# Patient Record
Sex: FEMALE | Race: ASIAN | NOT HISPANIC OR LATINO | Employment: FULL TIME | URBAN - METROPOLITAN AREA
[De-identification: names, ages, dates, MRNs, and addresses within clinical notes are randomized per-mention and may not be internally consistent; named-entity substitution may affect disease eponyms.]

---

## 2017-11-07 ENCOUNTER — HOSPITAL ENCOUNTER (OUTPATIENT)
Facility: MEDICAL CENTER | Age: 62
End: 2017-11-07
Payer: COMMERCIAL

## 2017-11-07 LAB
ERYTHROCYTE [DISTWIDTH] IN BLOOD BY AUTOMATED COUNT: 45.7 FL (ref 35.9–50)
HCT VFR BLD AUTO: 37 % (ref 37–47)
HGB BLD-MCNC: 12.4 G/DL (ref 12–16)
MCH RBC QN AUTO: 31.3 PG (ref 27–33)
MCHC RBC AUTO-ENTMCNC: 33.5 G/DL (ref 33.6–35)
MCV RBC AUTO: 93.4 FL (ref 81.4–97.8)
PLATELET # BLD AUTO: 267 K/UL (ref 164–446)
PMV BLD AUTO: 9.9 FL (ref 9–12.9)
RBC # BLD AUTO: 3.96 M/UL (ref 4.2–5.4)
WBC # BLD AUTO: 7.1 K/UL (ref 4.8–10.8)

## 2019-01-11 ENCOUNTER — OFFICE VISIT (OUTPATIENT)
Dept: URGENT CARE | Facility: PHYSICIAN GROUP | Age: 64
End: 2019-01-11
Payer: COMMERCIAL

## 2019-01-11 VITALS
OXYGEN SATURATION: 99 % | SYSTOLIC BLOOD PRESSURE: 126 MMHG | HEART RATE: 65 BPM | DIASTOLIC BLOOD PRESSURE: 88 MMHG | WEIGHT: 147 LBS | RESPIRATION RATE: 20 BRPM | TEMPERATURE: 97 F

## 2019-01-11 DIAGNOSIS — J01.00 ACUTE NON-RECURRENT MAXILLARY SINUSITIS: Primary | ICD-10-CM

## 2019-01-11 PROCEDURE — 99203 OFFICE O/P NEW LOW 30 MIN: CPT | Performed by: INTERNAL MEDICINE

## 2019-01-11 RX ORDER — FLUTICASONE PROPIONATE 50 MCG
SPRAY, SUSPENSION (ML) NASAL
Qty: 16 G | Refills: 0 | Status: SHIPPED | OUTPATIENT
Start: 2019-01-11 | End: 2019-03-25

## 2019-01-11 RX ORDER — AMOXICILLIN AND CLAVULANATE POTASSIUM 875; 125 MG/1; MG/1
1 TABLET, FILM COATED ORAL 2 TIMES DAILY
Qty: 20 TAB | Refills: 0 | Status: SHIPPED | OUTPATIENT
Start: 2019-01-11 | End: 2019-01-21

## 2019-01-11 ASSESSMENT — ENCOUNTER SYMPTOMS
SINUS PAIN: 1
SINUS PRESSURE: 1
TINGLING: 0
HOARSE VOICE: 1
DIZZINESS: 0
PSYCHIATRIC NEGATIVE: 1
HEADACHES: 1
EYES NEGATIVE: 1
COUGH: 1
FOCAL WEAKNESS: 0
CARDIOVASCULAR NEGATIVE: 1
SORE THROAT: 1
MUSCULOSKELETAL NEGATIVE: 1
WEAKNESS: 0
GASTROINTESTINAL NEGATIVE: 1
SENSORY CHANGE: 0
SHORTNESS OF BREATH: 0

## 2019-01-11 NOTE — PROGRESS NOTES
Subjective:     Luna Carpio is a 63 y.o. female who presents for Cough (x2 weeks, yellow mucus, sinus pressure, HA, chest congestion )       Cough   This is a new problem. Episode onset: 3 weeks ago. The problem has been gradually worsening. The cough is non-productive. Associated symptoms include headaches and a sore throat. Pertinent negatives include no chest pain, ear pain or shortness of breath. There is no history of asthma.   Sinusitis   This is a new problem. Episode onset: 3 weeks ago. The problem has been gradually worsening since onset. There has been no fever. The pain is moderate. Associated symptoms include congestion, coughing, headaches, a hoarse voice, sinus pressure (and pain) and a sore throat. Pertinent negatives include no ear pain or shortness of breath. Treatments tried: Sudafed. The treatment provided mild relief.     PMH:  has no past medical history on file.    MEDS:   Current Outpatient Prescriptions:   •  amoxicillin-clavulanate (AUGMENTIN) 875-125 MG Tab, Take 1 Tab by mouth 2 times a day for 10 days., Disp: 20 Tab, Rfl: 0  •  fluticasone (FLONASE) 50 MCG/ACT nasal spray, 1 spray in each nostril 2 times a day, Disp: 16 g, Rfl: 0    ALLERGIES: Not on File    SURGHX: No past surgical history on file.    SOCHX:  reports that she has never smoked. She has never used smokeless tobacco.     FH: Reviewed with patient, not pertinent to this visit.     Review of Systems   Constitutional: Positive for malaise/fatigue.   HENT: Positive for congestion, hoarse voice, sinus pain, sinus pressure (and pain) and sore throat. Negative for ear pain.    Eyes: Negative.    Respiratory: Positive for cough. Negative for shortness of breath.    Cardiovascular: Negative.  Negative for chest pain.   Gastrointestinal: Negative.    Genitourinary: Negative.    Musculoskeletal: Negative.    Skin: Negative.    Neurological: Positive for headaches. Negative for dizziness, tingling, sensory change, focal weakness and  weakness.   Psychiatric/Behavioral: Negative.    All other systems reviewed and are negative.    Objective:     /88   Pulse 65   Temp 36.1 °C (97 °F)   Resp 20   Wt 66.7 kg (147 lb)   SpO2 99%     Physical Exam   Constitutional: She is oriented to person, place, and time. She appears well-developed and well-nourished. She is cooperative. No distress.   HENT:   Head: Normocephalic.   Right Ear: Tympanic membrane normal.   Left Ear: Tympanic membrane normal.   Nose: Mucosal edema and rhinorrhea present. Right sinus exhibits maxillary sinus tenderness. Left sinus exhibits maxillary sinus tenderness.   Mouth/Throat: Uvula is midline, oropharynx is clear and moist and mucous membranes are normal. No posterior oropharyngeal edema or posterior oropharyngeal erythema.   Eyes: Pupils are equal, round, and reactive to light. Conjunctivae and EOM are normal.   Neck: Normal range of motion.   Cardiovascular: Normal rate, regular rhythm, normal heart sounds and normal pulses.    Pulmonary/Chest: Effort normal and breath sounds normal. No respiratory distress.   Abdominal: Soft. Bowel sounds are normal.   Musculoskeletal: Normal range of motion. She exhibits no deformity.   Lymphadenopathy:     She has no cervical adenopathy.   Neurological: She is alert and oriented to person, place, and time. She has normal strength. No sensory deficit.   Skin: Skin is warm and dry. Capillary refill takes less than 2 seconds.   Psychiatric: She has a normal mood and affect.   Vitals reviewed.       Assessment/Plan:     1. Acute non-recurrent maxillary sinusitis  - amoxicillin-clavulanate (AUGMENTIN) 875-125 MG Tab; Take 1 Tab by mouth 2 times a day for 10 days.  Dispense: 20 Tab; Refill: 0  - fluticasone (FLONASE) 50 MCG/ACT nasal spray; 1 spray in each nostril 2 times a day  Dispense: 16 g; Refill: 0  - REFERRAL TO FAMILY PRACTICE    Discussed supportive measures including increasing fluids and rest as well as OTC symptom management  including acetaminophen and/or ibuprofen PRN pain and/or fever. Continue Sudafed PRN. Also try nasal rinses/neti pot. Rx sent electronically.     Patient advised to: Return for 1) Symptoms don't improve or worsen, or go to ER, 2) Follow up with primary care in 7-10 days.    Differential diagnosis, natural history, supportive care, and indications for immediate follow-up discussed. All questions answered. Patient agrees with the plan of care.    Case and results reviewed and agree with treatment plan as outlined.  Dr. Coates

## 2019-01-11 NOTE — PATIENT INSTRUCTIONS

## 2019-03-25 ENCOUNTER — OFFICE VISIT (OUTPATIENT)
Dept: MEDICAL GROUP | Facility: PHYSICIAN GROUP | Age: 64
End: 2019-03-25
Payer: COMMERCIAL

## 2019-03-25 VITALS
HEIGHT: 69 IN | HEART RATE: 84 BPM | DIASTOLIC BLOOD PRESSURE: 72 MMHG | TEMPERATURE: 98.2 F | SYSTOLIC BLOOD PRESSURE: 106 MMHG | OXYGEN SATURATION: 100 % | WEIGHT: 149 LBS | BODY MASS INDEX: 22.07 KG/M2 | RESPIRATION RATE: 12 BRPM

## 2019-03-25 DIAGNOSIS — R94.31 LEFT AXIS DEVIATION: ICD-10-CM

## 2019-03-25 DIAGNOSIS — Z23 NEED FOR VACCINATION: ICD-10-CM

## 2019-03-25 DIAGNOSIS — E55.9 VITAMIN D DEFICIENCY: ICD-10-CM

## 2019-03-25 DIAGNOSIS — R53.83 FATIGUE, UNSPECIFIED TYPE: ICD-10-CM

## 2019-03-25 DIAGNOSIS — E53.8 VITAMIN B12 DEFICIENCY: ICD-10-CM

## 2019-03-25 DIAGNOSIS — J30.2 SEASONAL ALLERGIC RHINITIS, UNSPECIFIED TRIGGER: ICD-10-CM

## 2019-03-25 DIAGNOSIS — Z11.3 ROUTINE SCREENING FOR STI (SEXUALLY TRANSMITTED INFECTION): ICD-10-CM

## 2019-03-25 DIAGNOSIS — R42 DIZZINESS: Primary | ICD-10-CM

## 2019-03-25 DIAGNOSIS — Z12.11 SCREEN FOR COLON CANCER: ICD-10-CM

## 2019-03-25 DIAGNOSIS — Z51.81 MEDICATION MONITORING ENCOUNTER: ICD-10-CM

## 2019-03-25 DIAGNOSIS — E78.5 DYSLIPIDEMIA: ICD-10-CM

## 2019-03-25 DIAGNOSIS — E16.2 HYPOGLYCEMIA: ICD-10-CM

## 2019-03-25 DIAGNOSIS — Z12.31 ENCOUNTER FOR SCREENING MAMMOGRAM FOR MALIGNANT NEOPLASM OF BREAST: ICD-10-CM

## 2019-03-25 PROCEDURE — 93000 ELECTROCARDIOGRAM COMPLETE: CPT | Performed by: FAMILY MEDICINE

## 2019-03-25 PROCEDURE — 99205 OFFICE O/P NEW HI 60 MIN: CPT | Mod: 25 | Performed by: FAMILY MEDICINE

## 2019-03-25 PROCEDURE — 90715 TDAP VACCINE 7 YRS/> IM: CPT | Performed by: FAMILY MEDICINE

## 2019-03-25 PROCEDURE — 90471 IMMUNIZATION ADMIN: CPT | Performed by: FAMILY MEDICINE

## 2019-03-25 RX ORDER — LORATADINE 10 MG/1
10 TABLET ORAL DAILY
COMMUNITY
End: 2019-07-01

## 2019-03-25 ASSESSMENT — PATIENT HEALTH QUESTIONNAIRE - PHQ9: CLINICAL INTERPRETATION OF PHQ2 SCORE: 0

## 2019-03-25 NOTE — PROGRESS NOTES
cc: Establish care, DTaP vaccine, colon cancer screening, breast cancer screening, lightheadedness & dizzyness    Subjective:     Luna Carpio is a 63 y.o. female presenting  Establish care, DTaP vaccine, colon cancer screening, breast cancer screening, lightheadedness & dizzyness.  She reports she is been getting sometimes lightheaded and dizzy every other day for the last 4 years.  She denies any triggers and sometimes that it happens at rest.  She does exercise and does not notice it happens during exercise.  She denies any history of heart issues.  She denies any fall or any head trauma.  She reports for the same reason about 4 years ago she had a brain MRI done which was normal.  She denies skipping any meals but sometimes when she does feel like she is hungry or low in sugar she feels like this and then she will have something to eat and feel better.  She has had her vision checked recently and no issues with that.  She does have family history of type 2 diabetes and reports being borderline in the past.  She thinks she may have had some fatty liver past.  Lives with partner male. Full time. [employer]. No social or domestic concerns. LMP 10 years ago.  Review of systems:     Constitutional: Negative for fever, chills and positive fatigue.   HENT: Negative for sinus pressure, negative for ear pain or hearing loss  Eyes: Negative for blurriness, negative for double vision  Respiratory: Negative for cough and shortness of breath, negative for exertional shortness of breath  Cardiovascular: Negative for leg swelling, negative for palpitations, negative for chest pain  Gastrointestinal: Negative for nausea, vomiting, abdominal pain, constipation and diarrhea.  Genitourinary: Negative for dysuria and hematuria.   Skin: Negative for rash.   Neurological: Positive for dizziness, negative focal weakness and headaches.   Endo/Heme/Allergies: Denies bleeding, bruising, and recurrent  "allergies.  Psychiatric/Behavioral: Negative for depression and anxiety.        Current Outpatient Prescriptions:   •  loratadine (CLARITIN) 10 MG Tab, Take 10 mg by mouth every day., Disp: , Rfl:     Allergies, past medical history, past surgical history, family history, social history reviewed and updated    Objective:     Vitals: /72 (BP Location: Left arm, Patient Position: Sitting, BP Cuff Size: Adult)   Pulse 84   Temp 36.8 °C (98.2 °F) (Temporal)   Resp 12   Ht 1.753 m (5' 9\")   Wt 67.6 kg (149 lb)   SpO2 100%   Breastfeeding? No   BMI 22.00 kg/m²   General: Alert, pleasant, NAD  HEENT: Normocephalic.  Nontraumatic. EOMI, no icterus or pallor.  Conjunctivae and lids normal. External ears normal. Oropharynx non-erythematous, mucous membranes moist.  Neck supple.  No thyromegaly or masses palpated. No cervical or supraclavicular lymphadenopathy.  Heart: Regular rate and rhythm.  S1 and S2 normal.  No murmurs appreciated.  Respiratory: Normal respiratory effort.  Clear to auscultation bilaterally.  Abdomen: Non-distended, soft, non tender in all 4 quadrants.  Skin: Warm, dry, no rashes.  Musculoskeletal: Gait is normal.  Moves all extremities well.  Extremities: No leg edema.  Pedal pulses 2+ symmetric.   Psych:  Affect/mood is normal, judgement is good, memory is intact, grooming is appropriate.    Assessment/Plan:     Diagnoses and all orders for this visit:    Dizziness  -     EKG  -     EC-ECHOCARDIOGRAM COMPLETE W/ CONT; Future  -     REFERRAL TO CARDIOLOGY    Screen for colon cancer  -     REFERRAL TO GI FOR COLONOSCOPY    Need for vaccination  -     TDAP VACCINE =>8YO IM    Encounter for screening mammogram for malignant neoplasm of breast  -     MA-SCREEN MAMMO W/CAD-BILAT; Future    Seasonal allergic rhinitis, unspecified trigger    Fatigue, unspecified type  -     CBC WITH DIFFERENTIAL; Future  -     TSH WITH REFLEX TO FT4; Future    Medication monitoring encounter  -     Comp Metabolic " Panel; Future    Hypoglycemia  -     HEMOGLOBIN A1C; Future    Dyslipidemia  -     Lipid Profile; Future    Vitamin D deficiency  -     VITAMIN D,25 HYDROXY; Future    Vitamin B12 deficiency  -     VITAMIN B12; Future    Routine screening for STI (sexually transmitted infection)  -     HIV AG/AB COMBO ASSAY SCREENING; Future  -     HEPATITIS PANEL ACUTE(4 COMPONENTS); Future  -     Chlamydia/GC PCR Urine Or Swab; Future  -     MISCELLANEOUS TEST; Future    Left axis deviation  -     REFERRAL TO CARDIOLOGY    Other orders  -     Cancel: OCCULT BLOOD FECES IMMUNOASSAY (FIT); Future    -We will get DTaP vaccine, referral for colonoscopy and mammogram, at least 2 weeks before your next appointment please get fasting lab work 8 hours of no eating but drink water.  Will do EKG today.  Will also make an appointment to do echo imaging of her heart.  Always keep some sugar snacks with you and ensure you are drinking at least 6-8 glasses of water a day.  We will also recommend to get blood pressure cuff and record the top number and bottom number and heart rate in both arms every third day sometimes in the morning, sometimes in the afternoon, sometimes in the evening and fill this form out indicating timing of the day and the patient instructions form for blood pressure.  Ideal blood pressure is 110-120/60-80.  ER precautions given if any chest pain, shortness of breath, passing out, head trauma then please go to the ER or call 911. EKG showing borderline QT prolongation and borderline left axis deviation. Sinus rhythm with rate in the 60s. Will refer to cardiology.    Return in about 6 weeks (around 5/6/2019), or labs/BP, for Long, 40 min appnt.    Patient was seen for 60 minutes face-to-face of which greater than 50% of the visit was spent in counseling and coordination of care as documented above in their assessment and plan.

## 2019-03-25 NOTE — PATIENT INSTRUCTIONS
Diagnoses and all orders for this visit:    Screen for colon cancer  -     REFERRAL TO GI FOR COLONOSCOPY    Need for vaccination  -     TDAP VACCINE =>6YO IM    Encounter for screening mammogram for malignant neoplasm of breast  -     MA-SCREEN MAMMO W/CAD-BILAT; Future    Seasonal allergic rhinitis, unspecified trigger    Fatigue, unspecified type  -     CBC WITH DIFFERENTIAL; Future  -     TSH WITH REFLEX TO FT4; Future    Medication monitoring encounter  -     Comp Metabolic Panel; Future    Hypoglycemia  -     HEMOGLOBIN A1C; Future    Dyslipidemia  -     Lipid Profile; Future    Vitamin D deficiency  -     VITAMIN D,25 HYDROXY; Future    Vitamin B12 deficiency  -     VITAMIN B12; Future    Routine screening for STI (sexually transmitted infection)  -     HIV AG/AB COMBO ASSAY SCREENING; Future  -     HEPATITIS PANEL ACUTE(4 COMPONENTS); Future  -     Chlamydia/GC PCR Urine Or Swab; Future  -     MISCELLANEOUS TEST; Future    Dizziness  -     EKG  -     EC-ECHOCARDIOGRAM COMPLETE W/ CONT; Future    Other orders  -     Cancel: OCCULT BLOOD FECES IMMUNOASSAY (FIT); Future    -We will get DTaP vaccine, referral for colonoscopy and mammogram, at least 2 weeks before your next appointment please get fasting lab work 8 hours of no eating but drink water.  Will do EKG today.  Will also make an appointment to do echo imaging of her heart.  Always keep some sugar snacks with you and ensure you are drinking at least 6-8 glasses of water a day.  We will also recommend to get blood pressure cuff and record the top number and bottom number and heart rate in both arms every third day sometimes in the morning, sometimes in the afternoon, sometimes in the evening and fill this form out indicating timing of the day and the patient instructions form for blood pressure.  Ideal blood pressure is 110-120/60-80.  ER precautions given if any chest pain, shortness of breath, passing out, head trauma then please go to the ER or call  [phone]. EKG showing borderline QT prolongation and borderline left axis deviation. Sinus rhythm with rate in the 60s. Will refer to cardiology.    Return in about 6 weeks (around 5/6/2019), or labs/BP, for Long, 40 min appnt.    How to Take Your Blood Pressure  Blood pressure is a measurement of how strongly your blood is pressing against the walls of your arteries. Arteries are blood vessels that carry blood from your heart throughout your body. Your health care provider takes your blood pressure at each office visit. You can also take your own blood pressure at home with a blood pressure machine. You may need to take your own blood pressure:  · To confirm a diagnosis of high blood pressure (hypertension).  · To monitor your blood pressure over time.  · To make sure your blood pressure medicine is working.  Supplies needed:  To take your blood pressure, you will need a blood pressure machine. You can buy a blood pressure machine, or blood pressure monitor, at most [employer] or online. There are several types of home blood pressure monitors. When choosing one, consider the following:  · Choose a monitor that has an arm cuff.  · Choose a monitor that wraps snugly around your upper arm. You should be able to fit only one finger between your arm and the cuff.  · Do not choose a monitor that measures your blood pressure from your wrist or finger.  Your health care provider can suggest a reliable monitor that will meet your needs.  How to prepare  To get the most accurate reading, avoid the following for 30 minutes before you check your blood pressure:  · Drinking caffeine.  · Drinking alcohol.  · Eating.  · Smoking.  · Exercising.  Five minutes before you check your blood pressure:  · Empty your bladder.  · Sit quietly without talking in a dining chair, rather than in a soft couch or armchair.  How to take your blood pressure  To check your blood pressure, follow the instructions in the manual that came with your blood  "pressure monitor. If you have a digital blood pressure monitor, the instructions may be as follows:  1. Sit up straight.  2. Place your feet on the floor. Do not cross your ankles or legs.  3. Rest your left arm at the level of your heart on a table or desk or on the arm of a chair.  4. Pull up your shirt sleeve.  5. Wrap the blood pressure cuff around the upper part of your left arm, 1 inch (2.5 cm) above your elbow. It is best to wrap the cuff around bare skin.  6. Fit the cuff snugly around your arm. You should be able to place only one finger between the cuff and your arm.  7. Position the cord inside the groove of your elbow.  8. Press the power button.  9. Sit quietly while the cuff inflates and deflates.  10. Read the digital reading on the monitor screen and write it down (record it).  11. Wait 2-3 minutes, then repeat the steps starting at step 1.  What does my blood pressure reading mean?  A blood pressure reading is recorded as two numbers, such as \"120 over 80\" (or 120/80). The first (\"top\") number is called the systolic pressure. It is a measure of the pressure in your arteries as the heart beats. The second (\"bottom\") number is called the diastolic pressure. It is a measure of the pressure in your arteries as the heart relaxes between beats.  Blood pressure is classified into four stages. The following are the stages for adults who do not have a short-term serious illness or a chronic condition. Systolic pressure and diastolic pressure are measured in a unit called mm Hg.  Normal  · Systolic pressure: below 120.  · Diastolic pressure: below 80.  Prehypertension  · Systolic pressure: 120-139.  · Diastolic pressure: 80-89.  Hypertension stage 1  · Systolic pressure: 140-159.  · Diastolic pressure: 90-99.  Hypertension stage 2  · Systolic pressure: 160 or above.  · Diastolic pressure: 100 or above.  You can have prehypertension or hypertension even if only the systolic or only the diastolic number in your " reading is higher than normal.  Follow these instructions at home:  · Check your blood pressure as often as recommended by your health care provider.  · Take your monitor to the next appointment with your health care provider to make sure:  ¨ That you are using it correctly.  ¨ That it provides accurate readings.  · Be sure you understand what your goal blood pressure numbers are.  · Tell your health care provider if you are having any side effects from blood pressure medicine.  Contact a health care provider if:  · Your blood pressure is consistently high.  Get help right away if:  · Your systolic blood pressure is higher than 180.  · Your diastolic blood pressure is higher than 110.  This information is not intended to replace advice given to you by your health care provider. Make sure you discuss any questions you have with your health care provider.  Introduction  Your blood pressure on this visit to the emergency department or clinic is elevated. This does not necessarily mean you have high blood pressure (hypertension), but it does mean that your blood pressure needs to be rechecked. Many times your blood pressure can increase due to illness, pain, anxiety, or other factors.  We recommend that you get a series of blood pressure readings done over a period of 5 days. It is best to get a reading in the morning and one in the evening. You should make sure to sit and relax for 1-5 minutes before the reading is taken. Write the readings down and make a follow-up appointment with your health care provider to discuss the results. If there is not a free clinic or a drug store with a blood-pressure-taking machine near you, you can purchase blood-pressure-taking equipment from a drug store. Having one in the home allows you the convenience of taking your blood pressure while you are home and relaxed.  BLOOD PRESSURE LOG   Date: _______________________  · a.m. _____________________  · p.m. _____________________  Date:  _______________________  · a.m. _____________________  · p.m. _____________________  Date: _______________________  · a.m. _____________________  · p.m. _____________________  Date: _______________________  · a.m. _____________________  · p.m. _____________________  Date: _______________________  · a.m. _____________________  · p.m. _____________________  This information is not intended to replace advice given to you by your health care provider. Make sure you discuss any questions you have with your health care provider.

## 2019-04-05 ENCOUNTER — HOSPITAL ENCOUNTER (OUTPATIENT)
Dept: LAB | Facility: MEDICAL CENTER | Age: 64
End: 2019-04-05
Attending: FAMILY MEDICINE
Payer: COMMERCIAL

## 2019-04-05 DIAGNOSIS — E16.2 HYPOGLYCEMIA: ICD-10-CM

## 2019-04-05 DIAGNOSIS — R53.83 FATIGUE, UNSPECIFIED TYPE: ICD-10-CM

## 2019-04-05 DIAGNOSIS — Z11.3 ROUTINE SCREENING FOR STI (SEXUALLY TRANSMITTED INFECTION): ICD-10-CM

## 2019-04-05 DIAGNOSIS — E78.5 DYSLIPIDEMIA: ICD-10-CM

## 2019-04-05 DIAGNOSIS — E55.9 VITAMIN D DEFICIENCY: ICD-10-CM

## 2019-04-05 DIAGNOSIS — E53.8 VITAMIN B12 DEFICIENCY: ICD-10-CM

## 2019-04-05 DIAGNOSIS — Z51.81 MEDICATION MONITORING ENCOUNTER: ICD-10-CM

## 2019-04-05 LAB
25(OH)D3 SERPL-MCNC: 49 NG/ML (ref 30–100)
ALBUMIN SERPL BCP-MCNC: 4.3 G/DL (ref 3.2–4.9)
ALBUMIN/GLOB SERPL: 1.2 G/DL
ALP SERPL-CCNC: 60 U/L (ref 30–99)
ALT SERPL-CCNC: 12 U/L (ref 2–50)
ANION GAP SERPL CALC-SCNC: 7 MMOL/L (ref 0–11.9)
AST SERPL-CCNC: 20 U/L (ref 12–45)
BASOPHILS # BLD AUTO: 0.7 % (ref 0–1.8)
BASOPHILS # BLD: 0.06 K/UL (ref 0–0.12)
BILIRUB SERPL-MCNC: 1.1 MG/DL (ref 0.1–1.5)
BUN SERPL-MCNC: 16 MG/DL (ref 8–22)
CALCIUM SERPL-MCNC: 9.2 MG/DL (ref 8.5–10.5)
CHLORIDE SERPL-SCNC: 105 MMOL/L (ref 96–112)
CHOLEST SERPL-MCNC: 206 MG/DL (ref 100–199)
CO2 SERPL-SCNC: 28 MMOL/L (ref 20–33)
CREAT SERPL-MCNC: 0.75 MG/DL (ref 0.5–1.4)
EOSINOPHIL # BLD AUTO: 0.2 K/UL (ref 0–0.51)
EOSINOPHIL NFR BLD: 2.5 % (ref 0–6.9)
ERYTHROCYTE [DISTWIDTH] IN BLOOD BY AUTOMATED COUNT: 47.9 FL (ref 35.9–50)
EST. AVERAGE GLUCOSE BLD GHB EST-MCNC: 140 MG/DL
FASTING STATUS PATIENT QL REPORTED: NORMAL
GLOBULIN SER CALC-MCNC: 3.6 G/DL (ref 1.9–3.5)
GLUCOSE SERPL-MCNC: 90 MG/DL (ref 65–99)
HAV IGM SERPL QL IA: NEGATIVE
HBA1C MFR BLD: 6.5 % (ref 0–5.6)
HBV CORE IGM SER QL: NEGATIVE
HBV SURFACE AG SER QL: NEGATIVE
HCT VFR BLD AUTO: 37.7 % (ref 37–47)
HCV AB SER QL: NEGATIVE
HDLC SERPL-MCNC: 68 MG/DL
HGB BLD-MCNC: 12.2 G/DL (ref 12–16)
HIV 1+2 AB+HIV1 P24 AG SERPL QL IA: NON REACTIVE
IMM GRANULOCYTES # BLD AUTO: 0.02 K/UL (ref 0–0.11)
IMM GRANULOCYTES NFR BLD AUTO: 0.2 % (ref 0–0.9)
LDLC SERPL CALC-MCNC: 124 MG/DL
LYMPHOCYTES # BLD AUTO: 2.86 K/UL (ref 1–4.8)
LYMPHOCYTES NFR BLD: 35 % (ref 22–41)
MCH RBC QN AUTO: 30.2 PG (ref 27–33)
MCHC RBC AUTO-ENTMCNC: 32.4 G/DL (ref 33.6–35)
MCV RBC AUTO: 93.3 FL (ref 81.4–97.8)
MONOCYTES # BLD AUTO: 0.5 K/UL (ref 0–0.85)
MONOCYTES NFR BLD AUTO: 6.1 % (ref 0–13.4)
NEUTROPHILS # BLD AUTO: 4.52 K/UL (ref 2–7.15)
NEUTROPHILS NFR BLD: 55.5 % (ref 44–72)
NRBC # BLD AUTO: 0 K/UL
NRBC BLD-RTO: 0 /100 WBC
PLATELET # BLD AUTO: 347 K/UL (ref 164–446)
PMV BLD AUTO: 10 FL (ref 9–12.9)
POTASSIUM SERPL-SCNC: 3.9 MMOL/L (ref 3.6–5.5)
PROT SERPL-MCNC: 7.9 G/DL (ref 6–8.2)
RBC # BLD AUTO: 4.04 M/UL (ref 4.2–5.4)
SODIUM SERPL-SCNC: 140 MMOL/L (ref 135–145)
TRIGL SERPL-MCNC: 70 MG/DL (ref 0–149)
TSH SERPL DL<=0.005 MIU/L-ACNC: 1.92 UIU/ML (ref 0.38–5.33)
VIT B12 SERPL-MCNC: 538 PG/ML (ref 211–911)
WBC # BLD AUTO: 8.2 K/UL (ref 4.8–10.8)

## 2019-04-05 PROCEDURE — 85025 COMPLETE CBC W/AUTO DIFF WBC: CPT

## 2019-04-05 PROCEDURE — 84443 ASSAY THYROID STIM HORMONE: CPT

## 2019-04-05 PROCEDURE — 80074 ACUTE HEPATITIS PANEL: CPT

## 2019-04-05 PROCEDURE — 36415 COLL VENOUS BLD VENIPUNCTURE: CPT

## 2019-04-05 PROCEDURE — 83036 HEMOGLOBIN GLYCOSYLATED A1C: CPT

## 2019-04-05 PROCEDURE — 80061 LIPID PANEL: CPT

## 2019-04-05 PROCEDURE — 80053 COMPREHEN METABOLIC PANEL: CPT

## 2019-04-05 PROCEDURE — 82607 VITAMIN B-12: CPT

## 2019-04-05 PROCEDURE — 87591 N.GONORRHOEAE DNA AMP PROB: CPT

## 2019-04-05 PROCEDURE — 87491 CHLMYD TRACH DNA AMP PROBE: CPT

## 2019-04-05 PROCEDURE — 87389 HIV-1 AG W/HIV-1&-2 AB AG IA: CPT

## 2019-04-05 PROCEDURE — 82306 VITAMIN D 25 HYDROXY: CPT

## 2019-04-06 LAB
C TRACH DNA SPEC QL NAA+PROBE: NEGATIVE
N GONORRHOEA DNA SPEC QL NAA+PROBE: NEGATIVE
SPECIMEN SOURCE: NORMAL

## 2019-04-10 ENCOUNTER — TELEPHONE (OUTPATIENT)
Dept: MEDICAL GROUP | Facility: PHYSICIAN GROUP | Age: 64
End: 2019-04-10

## 2019-04-25 ENCOUNTER — OFFICE VISIT (OUTPATIENT)
Dept: MEDICAL GROUP | Facility: PHYSICIAN GROUP | Age: 64
End: 2019-04-25
Payer: COMMERCIAL

## 2019-04-25 VITALS
OXYGEN SATURATION: 96 % | BODY MASS INDEX: 23.88 KG/M2 | TEMPERATURE: 98.1 F | WEIGHT: 161.2 LBS | RESPIRATION RATE: 12 BRPM | HEART RATE: 74 BPM | SYSTOLIC BLOOD PRESSURE: 106 MMHG | HEIGHT: 69 IN | DIASTOLIC BLOOD PRESSURE: 72 MMHG

## 2019-04-25 DIAGNOSIS — M25.551 PAIN OF BOTH HIP JOINTS: ICD-10-CM

## 2019-04-25 DIAGNOSIS — M25.552 PAIN OF BOTH HIP JOINTS: ICD-10-CM

## 2019-04-25 DIAGNOSIS — G89.29 CHRONIC BILATERAL LOW BACK PAIN WITH LEFT-SIDED SCIATICA: ICD-10-CM

## 2019-04-25 DIAGNOSIS — M54.42 CHRONIC BILATERAL LOW BACK PAIN WITH LEFT-SIDED SCIATICA: ICD-10-CM

## 2019-04-25 PROCEDURE — 99214 OFFICE O/P EST MOD 30 MIN: CPT | Performed by: FAMILY MEDICINE

## 2019-04-25 RX ORDER — COVID-19 ANTIGEN TEST
KIT MISCELLANEOUS
COMMUNITY
End: 2019-04-25

## 2019-04-25 RX ORDER — IBUPROFEN 600 MG/1
600 TABLET ORAL EVERY 8 HOURS PRN
Qty: 60 TAB | Refills: 1 | Status: SHIPPED | OUTPATIENT
Start: 2019-04-25 | End: 2019-12-15

## 2019-04-25 RX ORDER — CYCLOBENZAPRINE HCL 5 MG
5-10 TABLET ORAL 3 TIMES DAILY PRN
Qty: 60 TAB | Refills: 1 | Status: SHIPPED | OUTPATIENT
Start: 2019-04-25 | End: 2019-07-01

## 2019-04-25 NOTE — PROGRESS NOTES
cc: Chronic bilateral low back pain with left-sided sciatica and chronic pain of bilateral hips    Subjective:     Luna Carpio is a 63 y.o. female presenting Chronic bilateral low back pain with left-sided sciatica and chronic pain of bilateral hips.  She reports for the last 5 days she is been getting increased pain of her low back pain with left-sided sciatica at nighttime and then the pain goes into both of her hips as well.  She said that she has had issues in the past with her low back pain and pain in her left hip but this has gotten worse since she drove to [place] and was sitting in the car for that many hours.  Her job requires her to be sitting for long periods of time which makes this worse.  For now she is just doing Aleve 1 tablet a day for the pain.  She also has a hemp cream that she topically massages at night which helps a little bit.  She denies any groin numbness she denies any injury to her back.  She denies any foot drop.  She denies any limp or problems walking.  She denies any loss of bowel or bladder control.  When she does do the bowel movement it does put some pain and pressure on her lower back.  She is not currently icing it.  But she is putting some heat on it which helps some.  She reports the pain right now is about 8 out of 10.  She has not had any back surgeries or hip surgeries.  She reports she seen orthopedic at [facility] before who did an epidural shot on her lower back about 4 years ago and cortisone shot in her hip about 1 year ago.  She has not had any recent imaging of her lower back or hips.  She has not had a DEXA scan recently.  She has not had any fractures or broken bones or car accidents.  She is able to sleep but when she wakes up in the pain is the worst when she gets up.  Stretching and heat therapy and Aleve makes it better.  Denies any dysuria or kidney pain.    Review of systems:     Constitutional: Negative for fever, chills and negative fatigue.   Eyes: Negative  "for blurriness  Respiratory: Negative for cough and shortness of breath  Cardiovascular: Negative for leg swelling, negative for palpitations, negative for chest pain  Gastrointestinal: Negative for nausea, vomiting, abdominal pain,  Genitourinary: Negative for dysuria and hematuria.   Skin: Negative for rash.   Neurological: Negative for dizziness, focal weakness and headaches.   Endo/Heme/Allergies: Denies bleeding, bruising        Current Outpatient Prescriptions:   •  cyclobenzaprine (FLEXERIL) 5 MG tablet, Take 1-2 Tabs by mouth 3 times a day as needed., Disp: 60 Tab, Rfl: 1  •  ibuprofen (MOTRIN) 600 MG Tab, Take 1 Tab by mouth every 8 hours as needed., Disp: 60 Tab, Rfl: 1  •  loratadine (CLARITIN) 10 MG Tab, Take 10 mg by mouth every day., Disp: , Rfl:     Allergies, past medical history, past surgical history, family history, social history reviewed and updated    Objective:     Vitals: /72 (BP Location: Left arm, Patient Position: Sitting, BP Cuff Size: Adult)   Pulse 74   Temp 36.7 °C (98.1 °F) (Temporal)   Resp 12   Ht 1.753 m (5' 9\")   Wt 73.1 kg (161 lb 3.2 oz)   SpO2 96%   Breastfeeding? No   BMI 23.81 kg/m²   General: Alert, pleasant, NAD  HEENT: Normocephalic.  Nontraumatic. EOMI, no icterus or pallor.  Conjunctivae and lids normal. External ears normal.  Heart: Regular rate and rhythm.  S1 and S2 normal.  No murmurs appreciated.  Respiratory: Normal respiratory effort.  Clear to auscultation bilaterally.  Abdomen: Non-distended, soft, non tender  Skin: Warm, dry, no rashes.  Musculoskeletal: Gait is normal.  Moves all extremities well.  Good extension and flexion at her neck and hips some pain on abduction of both of her hips and tenderness on posterior palpation of her hips and some spasm on palpation of her lower back on her bilateral paraspinous muscles.  Extremities: No leg edema.   Psych:  Affect/mood is normal, judgement is good, memory is intact, grooming is " appropriate.    Assessment/Plan:     Diagnoses and all orders for this visit:    Chronic bilateral low back pain with left-sided sciatica  -     DX-LUMBAR SPINE-4+ VIEWS; Future  -     cyclobenzaprine (FLEXERIL) 5 MG tablet; Take 1-2 Tabs by mouth 3 times a day as needed.  -     ibuprofen (MOTRIN) 600 MG Tab; Take 1 Tab by mouth every 8 hours as needed.    Pain of both hip joints  -     DX-HIP-BILATERAL-WITH PELVIS-3/4 VIEWS; Future  -     cyclobenzaprine (FLEXERIL) 5 MG tablet; Take 1-2 Tabs by mouth 3 times a day as needed.  -     ibuprofen (MOTRIN) 600 MG Tab; Take 1 Tab by mouth every 8 hours as needed.    -We will get x-rays of her lower back and bilateral hips.  Will try during the daytime with food to 600 mg of ibuprofen 1 tablet every 12 hours as needed for pain only and before bed try Flexeril 5 mg 1 to 2 tablets before bed to help relieve the muscle spasm.  Also recommend alternating 50 minutes of cold ice therapy and heating compress.  Can also try over-the-counter icy  rub and icy hot patches on her back and or hips.  Also patient instructions given on back pain, hip bursitis, and hip and back exercises for her to do.  If this does not improve from doing these exercises and yoga stretching and also she has access to a pool to do some water therapy or some hot tub and if this does not improve the x-rays show at her follow-up in 4 weeks will consider a hip injection.  Keep stretching and as she is able to sleep at night most likely this is muscular skeletal.  Will go over lab work at her next appointment.    Return in about 4 weeks (around 5/20/2019), or possible joint injection/labs FU, for Long, 40 min appnt, Procedure appnt.

## 2019-04-25 NOTE — PATIENT INSTRUCTIONS
Diagnoses and all orders for this visit:    Chronic bilateral low back pain with left-sided sciatica  -     DX-LUMBAR SPINE-4+ VIEWS; Future  -     cyclobenzaprine (FLEXERIL) 5 MG tablet; Take 1-2 Tabs by mouth 3 times a day as needed.  -     ibuprofen (MOTRIN) 600 MG Tab; Take 1 Tab by mouth every 8 hours as needed.    Pain of both hip joints  -     DX-HIP-BILATERAL-WITH PELVIS-3/4 VIEWS; Future  -     cyclobenzaprine (FLEXERIL) 5 MG tablet; Take 1-2 Tabs by mouth 3 times a day as needed.  -     ibuprofen (MOTRIN) 600 MG Tab; Take 1 Tab by mouth every 8 hours as needed.    -We will get x-rays of her lower back and bilateral hips.  Will try during the daytime with food to 600 mg of ibuprofen 1 tablet every 12 hours as needed for pain only and before bed try Flexeril 5 mg 1 to 2 tablets before bed to help relieve the muscle spasm.  Also recommend alternating 50 minutes of cold ice therapy and heating compress.  Can also try over-the-counter icy  rub and icy hot patches on her back and or hips.  Also patient instructions given on back pain, hip bursitis, and hip and back exercises for her to do.  If this does not improve from doing these exercises and yoga stretching and also she has access to a pool to do some water therapy or some hot tub and if this does not improve the x-rays show at her follow-up in 4 weeks will consider a hip injection.  Keep stretching and as she is able to sleep at night most likely this is muscular skeletal.    Return in about 4 weeks (around 5/20/2019), or possible joint injection/labs FU, for Long, 40 min appnt, Procedure appnt.  Hip Bursitis  Introduction  Hip bursitis is inflammation of a fluid-filled sac (bursa) in the hip joint. The bursa protects the bones in the hip joint from rubbing against each other. Hip bursitis can cause mild to moderate pain, and symptoms often come and go over time.  What are the causes?  This condition may be caused by:  · Injury to the  hip.  · Overuse of the muscles that surround the hip joint.  · Arthritis or gout.  · Diabetes.  · Thyroid disease.  · Cold weather.  · Infection.  In some cases, the cause may not be known.  What are the signs or symptoms?  Symptoms of this condition may include:  · Mild or moderate pain in the hip area. Pain may get worse with movement.  · Tenderness and swelling of the hip, especially on the outer side of the hip.  Symptoms may come and go. If the bursa becomes infected, you may have the following symptoms:  · Fever.  · Red skin and a feeling of warmth in the hip area.  How is this diagnosed?  This condition may be diagnosed based on:  · A physical exam.  · Your medical history.  · X-rays.  · Removal of fluid from your inflamed bursa for testing (biopsy).  You may be sent to a health care provider who specializes in bone diseases (orthopedist) or a provider who specializes in joint inflammation (rheumatologist).  How is this treated?  This condition is treated by resting, raising (elevating), and applying pressure (compression) to the injured area. In some cases, this may be enough to make your symptoms go away. Treatment may also include:  · Crutches.  · Antibiotic medicine.  · Draining fluid out of the bursa to help relieve swelling.  · Injecting medicine that helps to reduce inflammation (cortisone).  Follow these instructions at home:  Medicines  · Take over-the-counter and prescription medicines only as told by your health care provider.  · Do not drive or operate heavy machinery while taking prescription pain medicine, or as told by your health care provider.  · If you were prescribed an antibiotic, take it as told by your health care provider. Do not stop taking the antibiotic even if you start to feel better.  Activity  · Return to your normal activities as told by your health care provider. Ask your health care provider what activities are safe for you.  · Rest and protect your hip as much as possible  until your pain and swelling get better.  General instructions  · Wear compression wraps only as told by your health care provider.  · Elevate your hip above the level of your heart as much as you can without pain. To do this, try putting a pillow under your hips while you lie down.  · Do not use your hip to support your body weight until your health care provider says that you can. Use crutches as told by your health care provider.  · Gently massage and stretch your injured area as often as is comfortable.  · Keep all follow-up visits as told by your health care provider. This is important.  How is this prevented?  · Exercise regularly, as told by your health care provider.  · Warm up and stretch before being active.  · Cool down and stretch after being active.  · If an activity irritates your hip or causes pain, avoid the activity as much as possible.  · Avoid sitting down for long periods at a time.  Contact a health care provider if:  · You have a fever.  · You develop new symptoms.  · You have difficulty walking or doing everyday activities.  · You have pain that gets worse or does not get better with medicine.  · You develop red skin or a feeling of warmth in your hip area.  Get help right away if:  · You cannot move your hip.  · You have severe pain.  This information is not intended to replace advice given to you by your health care provider. Make sure you discuss any questions you have with your health care provider.  Document Released: 06/09/2003 Document Revised: 05/25/2017 Document Reviewed: 07/19/2016  © 2017 [employer]  Back Exercises  Back exercises help treat and prevent back injuries. The goal of back exercises is to increase the strength of your abdominal and back muscles and the flexibility of your back. These exercises should be started when you no longer have back pain. Back exercises include:  · Pelvic Tilt. Lie on your back with your knees bent. Tilt your pelvis until the lower part of your back  is against the floor. Hold this position 5 to 10 sec and repeat 5 to 10 times.  · Knee to Chest. Pull first 1 knee up against your chest and hold for 20 to 30 seconds, repeat this with the other knee, and then both knees. This may be done with the other leg straight or bent, whichever feels better.  · Sit-Ups or Curl-Ups. Bend your knees 90 degrees. Start with tilting your pelvis, and do a partial, slow sit-up, lifting your trunk only 30 to 45 degrees off the floor. Take at least 2 to 3 seconds for each sit-up. Do not do sit-ups with your knees out straight. If partial sit-ups are difficult, simply do the above but with only tightening your abdominal muscles and holding it as directed.  · Hip-Lift. Lie on your back with your knees flexed 90 degrees. Push down with your feet and shoulders as you raise your hips a couple inches off the floor; hold for 10 seconds, repeat 5 to 10 times.  · Back arches. Lie on your stomach, propping yourself up on bent elbows. Slowly press on your hands, causing an arch in your low back. Repeat 3 to 5 times. Any initial stiffness and discomfort should lessen with repetition over time.  · Shoulder-Lifts. Lie face down with arms beside your body. Keep hips and torso pressed to floor as you slowly lift your head and shoulders off the floor.  Do not overdo your exercises, especially in the beginning. Exercises may cause you some mild back discomfort which lasts for a few minutes; however, if the pain is more severe, or lasts for more than 15 minutes, do not continue exercises until you see your caregiver. Improvement with exercise therapy for back problems is slow.   See your caregivers for assistance with developing a proper back exercise program.     This information is not intended to replace advice given to you by your health care provider. Make sure you discuss any questions you have with your health care provider.     Document Released: 01/25/2006 Document Revised: 03/11/2013 Document  Reviewed: 02/11/2016  [employer] Patient Education ©2016 [employer]  Chronic Back Pain  When back pain lasts longer than 3 months, it is called chronic back pain. The cause of your back pain may not be known. Some common causes include:  · Wear and tear (degenerative disease) of the bones, ligaments, or disks in your back.  · Inflammation and stiffness in your back (arthritis).  People who have chronic back pain often go through certain periods in which the pain is more intense (flare-ups). Many people can learn to manage the pain with home care.  Follow these instructions at home:  Pay attention to any changes in your symptoms. Take these actions to help with your pain:  Activity  · Avoid bending and activities that make the problem worse.  · Do not sit or  one place for long periods of time.  · Take brief periods of rest throughout the day. This will reduce your pain. Resting in a lying or standing position is usually better than sitting to rest.  · When you are resting for longer periods, mix in some mild activity or stretching between periods of rest. This will help to prevent stiffness and pain.  · Get regular exercise. Ask your health care provider what activities are safe for you.  · Do not lift anything that is heavier than 10 lb (4.5 kg). Always use proper lifting technique, which includes:  ¨ Bending your knees.  ¨ Keeping the load close to your body.  ¨ Avoiding twisting.  Managing pain  · If directed, apply ice to the painful area. Your health care provider may recommend applying ice during the first 24-48 hours after a flare-up begins.  ¨ Put ice in a plastic bag.  ¨ Place a towel between your skin and the bag.  ¨ Leave the ice on for 20 minutes, 2-3 times per day.  · After icing, apply heat to the affected area as often as told by your health care provider. Use the heat source that your health care provider recommends, such as a moist heat pack or a heating pad.  ¨ Place a towel  between your skin and the heat source.  ¨ Leave the heat on for 20-30 minutes.  ¨ Remove the heat if your skin turns bright red. This is especially important if you are unable to feel pain, heat, or cold. You may have a greater risk of getting burned.  · Try soaking in a warm tub.  · Take over-the-counter and prescription medicines only as told by your health care provider.  · Keep all follow-up visits as told by your health care provider. This is important.  Contact a health care provider if:  · You have pain that is not relieved with rest or medicine.  Get help right away if:  · You have weakness or numbness in one or both of your legs or feet.  · You have trouble controlling your bladder or your bowels.  · You have nausea or vomiting.  · You have pain in your abdomen.  · You have shortness of breath or you faint.  This information is not intended to replace advice given to you by your health care provider. Make sure you discuss any questions you have with your health care provider.  Document Released: 01/25/2006 Document Revised: 04/27/2017 Document Reviewed: 06/06/2016  [employer] Patient Education © 2017 [employer]  Hip Exercises  Ask your health care provider which exercises are safe for you. Do exercises exactly as told by your health care provider and adjust them as directed. It is normal to feel mild stretching, pulling, tightness, or discomfort as you do these exercises, but you should stop right away if you feel sudden pain or your pain gets worse. Do not begin these exercises until told by your health care provider.  STRETCHING AND RANGE OF MOTION EXERCISES   These exercises warm up your muscles and joints and improve the movement and flexibility of your hip. These exercises also help to relieve pain, numbness, and tingling.  Exercise A: Hamstrings, Supine   1. Lie on your back.  2. Loop a belt or towel over the ball of your left / right foot. The ball of your foot is on the walking surface,  right under your toes.  3. Straighten your left / right knee and slowly pull on the belt to raise your leg.  ¨ Do not let your left / right knee bend while you do this.  ¨ Keep your other leg flat on the floor.  ¨ Raise the left / right leg until you feel a gentle stretch behind your left / right knee or thigh.  4. Hold this position for __________ seconds.  5. Slowly return your leg to the starting position.  Repeat __________ times. Complete this stretch __________ times a day.  Exercise B: Hip Rotators   1. Lie on your back on a firm surface.  2. Hold your left / right knee with your left / right hand. Hold your ankle with your other hand.  3. Gently pull your left / right knee and rotate your lower leg toward your other shoulder.  ¨ Pull until you feel a stretch in your buttocks.  ¨ Keep your hips and shoulders firmly planted while you do this stretch.  4. Hold this position for __________ seconds.  Repeat __________ times. Complete this stretch __________ times a day.  Exercise C: V-Sit (Hamstrings and Adductors)   1. Sit on the floor with your legs extended in a large “V” shape. Keep your knees straight during this exercise.  2. Start with your head and chest upright, then bend at your waist to reach for your left foot (position A). You should feel a stretch in your right inner thigh.  3. Hold this position for __________ seconds. Then slowly return to the upright position.  4. Bend at your waist to reach forward (position B). You should feel a stretch behind both of your thighs and knees.  5. Hold this position for __________ seconds. Then slowly return to the upright position.  6. Bend at your waist to reach for your right foot (position C). You should feel a stretch in your left inner thigh.  7. Hold this position for __________ seconds. Then slowly return to the upright position.  Repeat __________ times. Complete this stretch __________ times a day.  Exercise D: Lunge (Hip Flexors)   1. Place your left /  right knee on the floor and bend your other knee so that is directly over your ankle. You should be half-kneeling.  2. Keep good posture with your head over your shoulders.  3. Tighten your buttocks to point your tailbone downward. This helps your back to keep from arching too much.  4. You should feel a gentle stretch in the front of your left / right thigh and hip. If you do not feel any resistance, slightly slide your other foot forward and then slowly lunge forward so your knee once again lines up over your ankle.  5. Make sure your tailbone continues to point downward.  6. Hold this position for __________ seconds.  Repeat __________ times. Complete this stretch __________ times a day.  STRENGTHENING EXERCISES   These exercises build strength and endurance in your hip. Endurance is the ability to use your muscles for a long time, even after they get tired.  Exercise E: Bridge (Hip Extensors)   1. Lie on your back on a firm surface with your knees bent and your feet flat on the floor.  2. Tighten your buttocks muscles and lift your bottom off the floor until the trunk of your body is level with your thighs.  ¨ Do not arch your back.  ¨ You should feel the muscles working in your buttocks and the back of your thighs. If you do not feel these muscles, slide your feet 1-2 inches (2.5-5 cm) farther away from your buttocks.  3. Hold this position for __________ seconds.  4. Slowly lower your hips to the starting position.  5. Let your muscles relax completely between repetitions.  6. If this exercise is too easy, try doing it with your arms crossed over your chest.  Repeat __________ times. Complete this exercise __________ times a day.  Exercise F: Straight Leg Raises - Hip Abductors   1. Lie on your side with your left / right leg in the top position. Lie so your head, shoulder, knee, and hip line up with each other. You may bend your bottom knee to help you balance.  2. Roll your hips slightly forward, so your  hips are stacked directly over each other and your left / right knee is facing forward.  3. Leading with your heel, lift your top leg 4-6 inches (10-15 cm). You should feel the muscles in your outer hip lifting.  ¨ Do not let your foot drift forward.  ¨ Do not let your knee roll toward the ceiling.  4. Hold this position for __________ seconds.  5. Slowly return to the starting position.  6. Let your muscles relax completely between repetitions.  Repeat __________ times. Complete this exercise __________ times a day.  Exercise G: Straight Leg Raises - Hip Adductors   1. Lie on your side with your left / right leg in the bottom position. Lie so your head, shoulder, knee, and hip line up. You may place your upper foot in front to help you balance.  2. Roll your hips slightly forward, so your hips are stacked directly over each other and your left / right knee is facing forward.  3. Tense the muscles in your inner thigh and lift your bottom leg 4-6 inches (10-15 cm).  4. Hold this position for __________ seconds.  5. Slowly return to the starting position.  6. Let your muscles relax completely between repetitions.  Repeat __________ times. Complete this exercise __________ times a day.  Exercise H: Straight Leg Raises - Quadriceps   1. Lie on your back with your left / right leg extended and your other knee bent.  2. Tense the muscles in the front of your left / right thigh. When you do this, you should see your kneecap slide up or see increased dimpling just above your knee.  3. Tighten these muscles even more and raise your leg 4-6 inches (10-15 cm) off the floor.  4. Hold this position for __________ seconds.  5. Keep these muscles tense as you lower your leg.  6. Relax the muscles slowly and completely between repetitions.  Repeat __________ times. Complete this exercise __________ times a day.  Exercise I: Hip Abductors, Standing  1. Tie one end of a rubber exercise band or tubing to a secure surface, such as a  "table or pole.  2. Loop the other end of the band or tubing around your left / right ankle.  3. Keeping your ankle with the band or tubing directly opposite of the secured end, step away until there is tension in the tubing or band. Hold onto a chair as needed for balance.  4. Lift your left / right leg out to your side. While you do this:  ¨ Keep your back upright.  ¨ Keep your shoulders over your hips.  ¨ Keep your toes pointing forward.  ¨ Make sure to use your hip muscles to lift your leg. Do not \"throw\" your leg or tip your body to lift your leg.  5. Hold this position for __________ seconds.  6. Slowly return to the starting position.  Repeat __________ times. Complete this exercise __________ times a day.  Exercise J: Squats (Quadriceps)  1.  a door frame so your feet and knees are in line with the frame. You may place your hands on the frame for balance.  2. Slowly bend your knees and lower your hips like you are going to sit in a chair.  ¨ Keep your lower legs in a straight-up-and-down position.  ¨ Do not let your hips go lower than your knees.  ¨ Do not bend your knees lower than told by your health care provider.  ¨ If your hip pain increases, do not bend as low.  3. Hold this position for ___________ seconds.  4. Slowly push with your legs to return to standing. Do not use your hands to pull yourself to standing.  Repeat __________ times. Complete this exercise __________ times a day.  This information is not intended to replace advice given to you by your health care provider. Make sure you discuss any questions you have with your health care provider.    "

## 2019-07-01 ENCOUNTER — HOSPITAL ENCOUNTER (OUTPATIENT)
Dept: RADIOLOGY | Facility: MEDICAL CENTER | Age: 64
End: 2019-07-01
Attending: FAMILY MEDICINE
Payer: COMMERCIAL

## 2019-07-01 ENCOUNTER — PATIENT MESSAGE (OUTPATIENT)
Dept: MEDICAL GROUP | Facility: PHYSICIAN GROUP | Age: 64
End: 2019-07-01

## 2019-07-01 ENCOUNTER — OFFICE VISIT (OUTPATIENT)
Dept: MEDICAL GROUP | Facility: PHYSICIAN GROUP | Age: 64
End: 2019-07-01
Payer: COMMERCIAL

## 2019-07-01 VITALS
RESPIRATION RATE: 12 BRPM | BODY MASS INDEX: 23.46 KG/M2 | DIASTOLIC BLOOD PRESSURE: 78 MMHG | HEIGHT: 69 IN | TEMPERATURE: 98.5 F | SYSTOLIC BLOOD PRESSURE: 102 MMHG | OXYGEN SATURATION: 98 % | HEART RATE: 72 BPM | WEIGHT: 158.4 LBS

## 2019-07-01 DIAGNOSIS — M25.552 PAIN OF BOTH HIP JOINTS: ICD-10-CM

## 2019-07-01 DIAGNOSIS — E78.5 DYSLIPIDEMIA: ICD-10-CM

## 2019-07-01 DIAGNOSIS — M25.551 PAIN OF BOTH HIP JOINTS: ICD-10-CM

## 2019-07-01 DIAGNOSIS — E11.9 TYPE 2 DIABETES MELLITUS WITHOUT COMPLICATION, WITHOUT LONG-TERM CURRENT USE OF INSULIN (HCC): ICD-10-CM

## 2019-07-01 PROBLEM — E16.2 HYPOGLYCEMIA: Status: RESOLVED | Noted: 2019-03-25 | Resolved: 2019-07-01

## 2019-07-01 PROBLEM — R42 DIZZINESS: Status: RESOLVED | Noted: 2019-03-25 | Resolved: 2019-07-01

## 2019-07-01 PROCEDURE — 99214 OFFICE O/P EST MOD 30 MIN: CPT | Performed by: FAMILY MEDICINE

## 2019-07-01 PROCEDURE — 73522 X-RAY EXAM HIPS BI 3-4 VIEWS: CPT

## 2019-07-01 RX ORDER — ATORVASTATIN CALCIUM 20 MG/1
20 TABLET, FILM COATED ORAL EVERY EVENING
Qty: 90 TAB | Refills: 1 | Status: SHIPPED | OUTPATIENT
Start: 2019-07-01 | End: 2019-12-02

## 2019-07-01 NOTE — PATIENT INSTRUCTIONS
Diagnoses and all orders for this visit:    Type 2 diabetes mellitus without complication, without long-term current use of insulin (HCC)  -     metFORMIN (GLUCOPHAGE) 500 MG Tab; Take 1 Tab by mouth 2 times a day, with meals.    Dyslipidemia  -     atorvastatin (LIPITOR) 20 MG Tab; Take 1 Tab by mouth every evening.    Pain of both hip joints    -Complete blood count within normal limits, complete metabolic panel within normal limits, kidney GFR within normal limits, STD screen chlamydia and gonorrhea negative, A1c 6.5.  TSH thyroid within normal limits, lipid panel with LDL high at 124 and total cholesterol mildly high at 206.  Vitamin D 49 within normal limits, vitamin B12 538, rest of STD screening HIV and hepatitis negative.  She is currently supplementing with vitamin D and B12.  -For new onset diabetes A1c 6.5 we will start her on low-dose metformin 500 mg twice a day with meals this can initially cause some loose stools or diarrhea but that should go away after couple of days.  Also take atorvastatin cholesterol medication at night daily for her cholesterol and she could do over-the-counter red yeast and or omega-3 fatty acid fish oil twice a day with meals.  Please read patient instruction section on cholesterol and new medications and frequently asked questions about diabetes and hypoglycemic precautions and always keep some sugar candy or snack on you in case you feel low sugar.  Drink enough water as well to stay hydrated.  She is [place] seen the eye doctor and will follow-up in 1 year.  Please check with the patient instructions on diabetes and foot care and check your feet daily to avoid any infections.  Please use ibuprofen sparingly for hips and then when she returns back from her trip and gets the x-ray done we will do a left hip injection on her return.  She will work on her diet and exercise and can try some apps if you want as lose it or my fitness tracker to keep track of what she is eating  only thing is they do not restrict for fruits to make sure not having too much fruits as that has a lot of sugar and look at food labels which is in the patient instruction section and she will work on her diabetes and we will recheck her A1c in 3 months and if she still not improving then we will send her to the diabetic nurse but for now she will try on her own with her diet and exercise and keep track of what she is eating and reduce her sugars and fried food at the [employer] parties.  She has been prediabetic in the past but never diabetic and so we will work on this and try for a 5 pound weight loss and to work on nutritious snacks and diets with portion control and limiting certain carbs.  Drink plenty of water and we will see her back in a few weeks for her hip injection and then in 3 months for her diabetes recheck.    Return in about 3 months (around 10/1/2019), or new diabetes/meds/A1C/ 3 weeks for L hip injection procedure, for Diabetes.    Fat and Cholesterol Restricted Diet  High levels of fat and cholesterol in your blood may lead to various health problems, such as diseases of the heart, blood vessels, gallbladder, liver, and pancreas. Fats are concentrated sources of energy that come in various forms. Certain types of fat, including saturated fat, may be harmful in excess. Cholesterol is a substance needed by your body in small amounts. Your body makes all the cholesterol it needs. Excess cholesterol comes from the food you eat.  When you have high levels of cholesterol and saturated fat in your blood, health problems can develop because the excess fat and cholesterol will gather along the walls of your blood vessels, causing them to narrow. Choosing the right foods will help you control your intake of fat and cholesterol. This will help keep the levels of these substances in your blood within normal limits and reduce your risk of disease.  WHAT IS MY PLAN?  Your health care provider recommends that  "you:  · Get no more than __________ % of the total calories in your daily diet from fat.  · Limit your intake of saturated fat to less than ______% of your total calories each day.  · Limit the amount of cholesterol in your diet to less than _________mg per day.  WHAT TYPES OF FAT SHOULD I CHOOSE?  · Choose healthy fats more often. Choose monounsaturated and polyunsaturated fats, such as olive and canola oil, flaxseeds, walnuts, almonds, and seeds.  · Eat more omega-3 fats. Good choices include salmon, mackerel, sardines, tuna, flaxseed oil, and ground flaxseeds. Aim to eat fish at least two times a week.  · Limit saturated fats. Saturated fats are primarily found in animal products, such as meats, butter, and cream. Plant sources of saturated fats include palm oil, palm kernel oil, and coconut oil.  · Avoid foods with partially hydrogenated oils in them. These contain trans fats. Examples of foods that contain trans fats are stick margarine, some tub margarines, cookies, crackers, and other baked goods.  WHAT GENERAL GUIDELINES DO I NEED TO FOLLOW?  These guidelines for healthy eating will help you control your intake of fat and cholesterol:  · Check food labels carefully to identify foods with trans fats or high amounts of saturated fat.  · Fill one half of your plate with vegetables and green salads.  · Fill one fourth of your plate with whole grains. Look for the word \"whole\" as the first word in the ingredient list.  · Fill one fourth of your plate with lean protein foods.  · Limit fruit to two servings a day. Choose fruit instead of juice.  · Eat more foods that contain soluble fiber. Examples of foods that contain this type of fiber are apples, broccoli, carrots, beans, peas, and barley. Aim to get 20-30 g of fiber per day.  · Eat more home-cooked food and less restaurant, buffet, and fast food.  · Limit or avoid alcohol.  · Limit foods high in starch and sugar.  · Limit fried foods.  · Cook foods using " methods other than frying. Baking, boiling, grilling, and broiling are all great options.  · Lose weight if you are overweight. Losing just 5-10% of your initial body weight can help your overall health and prevent diseases such as diabetes and heart disease.  WHAT FOODS CAN I EAT?  Grains  Whole grains, such as whole wheat or whole grain breads, crackers, cereals, and pasta. Unsweetened oatmeal, bulgur, barley, quinoa, or brown rice. Corn or whole wheat flour tortillas.  Vegetables  Fresh or frozen vegetables (raw, steamed, roasted, or grilled). Green salads.  Fruits  All fresh, canned (in natural juice), or frozen fruits.  Meat and Other Protein Products  Ground beef (85% or leaner), grass-fed beef, or beef trimmed of fat. Skinless chicken or turkey. Ground chicken or turkey. Pork trimmed of fat. All fish and seafood. Eggs. Dried beans, peas, or lentils. Unsalted nuts or seeds. Unsalted canned or dry beans.  Dairy  Low-fat dairy products, such as skim or 1% milk, 2% or reduced-fat cheeses, low-fat ricotta or cottage cheese, or plain low-fat yogurt.  Fats and Oils  Tub margarines without trans fats. Light or reduced-fat mayonnaise and salad dressings. Avocado. Olive, canola, sesame, or safflower oils. Natural peanut or almond butter (choose ones without added sugar and oil).  The items listed above may not be a complete list of recommended foods or beverages. Contact your dietitian for more options.  WHAT FOODS ARE NOT RECOMMENDED?  Grains  White bread. White pasta. White rice. Cornbread. Bagels, pastries, and croissants. Crackers that contain trans fat.  Vegetables  White potatoes. Corn. Creamed or fried vegetables. Vegetables in a cheese sauce.  Fruits  Dried fruits. Canned fruit in light or heavy syrup. Fruit juice.  Meat and Other Protein Products  Fatty cuts of meat. Ribs, chicken wings, aranda, sausage, bologna, salami, chitterlings, fatback, hot dogs, bratwurst, and packaged luncheon meats. Liver and organ  meats.  Dairy  Whole or 2% milk, cream, half-and-half, and cream cheese. Whole milk cheeses. Whole-fat or sweetened yogurt. Full-fat cheeses. Nondairy creamers and whipped toppings. Processed cheese, cheese spreads, or cheese curds.  Sweets and Desserts  Corn syrup, sugars, honey, and molasses. Candy. Jam and jelly. Syrup. Sweetened cereals. Cookies, pies, cakes, donuts, muffins, and ice cream.  Fats and Oils  Butter, stick margarine, lard, shortening, ghee, or aranda fat. Coconut, palm kernel, or palm oils.  Beverages  Alcohol. Sweetened drinks (such as sodas, lemonade, and fruit drinks or punches).  The items listed above may not be a complete list of foods and beverages to avoid. Contact your dietitian for more information.     This information is not intended to replace advice given to you by your health care provider. Make sure you discuss any questions you have with your health care provider.     Document Released: 12/18/2006 Document Revised: 01/08/2016 Document Reviewed: 03/18/2015  [employer] Interactive Patient Education ©2016 [employer]  Metformin tablets  What is this medicine?  METFORMIN (met FOR min) is used to treat type 2 diabetes. It helps to control blood sugar. Treatment is combined with diet and exercise. This medicine can be used alone or with other medicines for diabetes.  This medicine may be used for other purposes; ask your health care provider or pharmacist if you have questions.  COMMON BRAND NAME(S): Glucophage  What should I tell my health care provider before I take this medicine?  They need to know if you have any of these conditions:  -anemia  -frequently drink alcohol-containing beverages  -become easily dehydrated  -heart attack  -heart failure that is treated with medications  -kidney disease  -liver disease  -polycystic ovary syndrome  -serious infection or injury  -vomiting  -an unusual or allergic reaction to metformin, other medicines, foods, dyes, or preservatives  -pregnant  or trying to get pregnant  -breast-feeding  How should I use this medicine?  Take this medicine by mouth. Take it with meals. Swallow the tablets with a drink of water. Follow the directions on the prescription label. Take your medicine at regular intervals. Do not take your medicine more often than directed.  Talk to your pediatrician regarding the use of this medicine in children. While this drug may be prescribed for children as young as 10 years of age for selected conditions, precautions do apply.  Overdosage: If you think you have taken too much of this medicine contact a poison control center or emergency room at once.  NOTE: This medicine is only for you. Do not share this medicine with others.  What if I miss a dose?  If you miss a dose, take it as soon as you can. If it is almost time for your next dose, take only that dose. Do not take double or extra doses.  What may interact with this medicine?  Do not take this medicine with any of the following medications:  -dofetilide  -gatifloxacin  -certain contrast medicines given before X-rays, CT scans, MRI, or other procedures  This medicine may also interact with the following medications:  -acetazolamide  -certain medicines for HIV infection or hepatitis, like adefovir, emtricitabine, entecavir, lamivudine, or tenofovir  -cimetidine  -crizotinib  -digoxin  -diuretics  -female hormones, like estrogens or progestins and birth control pills  -glycopyrrolate  -isoniazid  -lamotrigine  -medicines for blood pressure, heart disease, irregular heart beat  -memantine  -midodrine  -methazolamide  -morphine  -nicotinic acid  -phenothiazines like chlorpromazine, mesoridazine, prochlorperazine, thioridazine  -phenytoin  -procainamide  -propantheline  -quinidine  -quinine  -ranitidine  -ranolazine  -steroid medicines like prednisone or cortisone  -stimulant medicines for attention disorders, weight loss, or to stay awake  -thyroid  medicines  -topiramate  -trimethoprim  -trospium  -vancomycin  -vandetanib  -zonisamide  This list may not describe all possible interactions. Give your health care provider a list of all the medicines, herbs, non-prescription drugs, or dietary supplements you use. Also tell them if you smoke, drink alcohol, or use illegal drugs. Some items may interact with your medicine.  What should I watch for while using this medicine?  Visit your doctor or health care professional for regular checks on your progress.  A test called the HbA1C (A1C) will be monitored. This is a simple blood test. It measures your blood sugar control over the last 2 to 3 months. You will receive this test every 3 to 6 months.  Learn how to check your blood sugar. Learn the symptoms of low and high blood sugar and how to manage them.  Always carry a quick-source of sugar with you in case you have symptoms of low blood sugar. Examples include hard sugar candy or glucose tablets. Make sure others know that you can choke if you eat or drink when you develop serious symptoms of low blood sugar, such as seizures or unconsciousness. They must get medical help at once.  Tell your doctor or health care professional if you have high blood sugar. You might need to change the dose of your medicine. If you are sick or exercising more than usual, you might need to change the dose of your medicine.  Do not skip meals. Ask your doctor or health care professional if you should avoid alcohol. Many nonprescription cough and cold products contain sugar or alcohol. These can affect blood sugar.  This medicine may cause ovulation in premenopausal women who do not have regular monthly periods. This may increase your chances of becoming pregnant. You should not take this medicine if you become pregnant or think you may be pregnant. Talk with your doctor or health care professional about your birth control options while taking this medicine. Contact your doctor or health  care professional right away if think you are pregnant.  If you are going to need surgery, a MRI, CT scan, or other procedure, tell your doctor that you are taking this medicine. You may need to stop taking this medicine before the procedure.  Wear a medical ID bracelet or chain, and carry a card that describes your disease and details of your medicine and dosage times.  What side effects may I notice from receiving this medicine?  Side effects that you should report to your doctor or health care professional as soon as possible:  -allergic reactions like skin rash, itching or hives, swelling of the face, lips, or tongue  -breathing problems  -feeling faint or lightheaded, falls  -muscle aches or pains  -signs and symptoms of low blood sugar such as feeling anxious, confusion, dizziness, increased hunger, unusually weak or tired, sweating, shakiness, cold, irritable, headache, blurred vision, fast heartbeat, loss of consciousness  -slow or irregular heartbeat  -unusual stomach pain or discomfort  -unusually tired or weak  Side effects that usually do not require medical attention (report to your doctor or health care professional if they continue or are bothersome):  -diarrhea  -headache  -heartburn  -metallic taste in mouth  -nausea  -stomach gas, upset  This list may not describe all possible side effects. Call your doctor for medical advice about side effects. You may report side effects to FDA at 2-232-FDA-5715.  Where should I keep my medicine?  Keep out of the reach of children.  Store at room temperature between 15 and 30 degrees C (59 and 86 degrees F). Protect from moisture and light. Throw away any unused medicine after the expiration date.  NOTE: This sheet is a summary. It may not cover all possible information. If you have questions about this medicine, talk to your doctor, pharmacist, or health care provider.  © 2018 [employer] (2015-06-02 22:14:40)  Atorvastatin tablets  What is this  medicine?  ATORVASTATIN (a TORE va sta tin) is known as a HMG-CoA reductase inhibitor or 'statin'. It lowers the level of cholesterol and triglycerides in the blood. This drug may also reduce the risk of heart attack, stroke, or other health problems in patients with risk factors for heart disease. Diet and lifestyle changes are often used with this drug.  This medicine may be used for other purposes; ask your health care provider or pharmacist if you have questions.  COMMON BRAND NAME(S): Lipitor  What should I tell my health care provider before I take this medicine?  They need to know if you have any of these conditions:  -frequently drink alcoholic beverages  -history of stroke, TIA  -kidney disease  -liver disease  -muscle aches or weakness  -other medical condition  -an unusual or allergic reaction to atorvastatin, other medicines, foods, dyes, or preservatives  -pregnant or trying to get pregnant  -breast-feeding  How should I use this medicine?  Take this medicine by mouth with a glass of water. Follow the directions on the prescription label. You can take this medicine with or without food. Take your doses at regular intervals. Do not take your medicine more often than directed.  Talk to your pediatrician regarding the use of this medicine in children. While this drug may be prescribed for children as young as 10 years old for selected conditions, precautions do apply.  Overdosage: If you think you have taken too much of this medicine contact a poison control center or emergency room at once.  NOTE: This medicine is only for you. Do not share this medicine with others.  What if I miss a dose?  If you miss a dose, take it as soon as you can. If it is almost time for your next dose, take only that dose. Do not take double or extra doses.  What may interact with this medicine?  Do not take this medicine with any of the following medications:  -red yeast rice  -telaprevir  -telithromycin  -voriconazole  This  medicine may also interact with the following medications:  -alcohol  -antiviral medicines for HIV or AIDS  -boceprevir  -certain antibiotics like clarithromycin, erythromycin, troleandomycin  -certain medicines for cholesterol like fenofibrate or gemfibrozil  -cimetidine  -clarithromycin  -colchicine  -cyclosporine  -digoxin  -female hormones, like estrogens or progestins and birth control pills  -grapefruit juice  -medicines for fungal infections like fluconazole, itraconazole, ketoconazole  -niacin  -rifampin  -spironolactone  This list may not describe all possible interactions. Give your health care provider a list of all the medicines, herbs, non-prescription drugs, or dietary supplements you use. Also tell them if you smoke, drink alcohol, or use illegal drugs. Some items may interact with your medicine.  What should I watch for while using this medicine?  Visit your doctor or health care professional for regular check-ups. You may need regular tests to make sure your liver is working properly.  Tell your doctor or health care professional right away if you get any unexplained muscle pain, tenderness, or weakness, especially if you also have a fever and tiredness. Your doctor or health care professional may tell you to stop taking this medicine if you develop muscle problems. If your muscle problems do not go away after stopping this medicine, contact your health care professional.  This drug is only part of a total heart-health program. Your doctor or a dietician can suggest a low-cholesterol and low-fat diet to help. Avoid alcohol and smoking, and keep a proper exercise schedule.  Do not use this drug if you are pregnant or breast-feeding. Serious side effects to an unborn child or to an infant are possible. Talk to your doctor or pharmacist for more information.  This medicine may affect blood sugar levels. If you have diabetes, check with your doctor or health care professional before you change your diet  or the dose of your diabetic medicine.  If you are going to have surgery tell your health care professional that you are taking this drug.  What side effects may I notice from receiving this medicine?  Side effects that you should report to your doctor or health care professional as soon as possible:  -allergic reactions like skin rash, itching or hives, swelling of the face, lips, or tongue  -dark urine  -fever  -joint pain  -muscle cramps, pain  -redness, blistering, peeling or loosening of the skin, including inside the mouth  -trouble passing urine or change in the amount of urine  -unusually weak or tired  -yellowing of eyes or skin  Side effects that usually do not require medical attention (report to your doctor or health care professional if they continue or are bothersome):  -constipation  -heartburn  -stomach gas, pain, upset  This list may not describe all possible side effects. Call your doctor for medical advice about side effects. You may report side effects to FDA at 5-239-FDA-4878.  Where should I keep my medicine?  Keep out of the reach of children.  Store at room temperature between 20 to 25 degrees C (68 to 77 degrees F). Throw away any unused medicine after the expiration date.  NOTE: This sheet is a summary. It may not cover all possible information. If you have questions about this medicine, talk to your doctor, pharmacist, or health care provider.  © 2018 [employer] Standard (2012-11-06 09:18:24)  2400 Calorie Diet for Diabetes Meal Planning  The 2400 calorie diet is designed for eating up to 2400 calories each day. Following this diet and making healthy meal choices can help improve overall health. This diet controls blood sugar (glucose) levels and can also lower blood pressure and cholesterol.   SERVING SIZES  Measuring foods and serving sizes helps to make sure you are getting the right amount of food. The list below tells how big or small some common serving sizes are.  · 1  oz.........4 stacked dice.   · 3 oz.........Deck of cards.   · 1 tsp........Tip of little finger.   · 1 tbs........Thumb.   · 2 tbs........Golf ball.   · ½ cup.......Half of a fist.   · 1 cup........A fist.   GUIDELINES FOR CHOOSING FOODS  The goal of this diet is to eat a variety of foods and limit calories to 2400 each day. This can be done by choosing foods that are low in calories and in fat. The diet also suggests eating small amounts of food often. Doing this helps control your blood glucose levels so they do not get too high or low. Each meal or snack should contain a protein food source to help you feel more satisfied and to stabilize your blood glucose. Try to eat about the same amount of food around the same time each day. This includes weekend days, travel days, and days off work. Space your meals about 4 to 5 hours apart and add a snack between them if you wish.   For example, a daily food plan could include breakfast, a morning snack, lunch, dinner, and an evening snack. Healthy meals and snacks include whole grains, vegetables, fruits, lean meats, poultry, fish, and dairy products. As you plan your meals, choose a variety of foods. Choose from the bread and starches, vegetables, fruit, dairy, and meat/protein groups. Examples of foods from each group are listed below with their suggested serving sizes. Use measuring cups and spoons to become familiar with what a healthy portion looks like.  Bread and Starch  Each serving equals 15 grams of carbohydrates.  · 1 slice bread.   · ¼ bagel.   · ¾ cup cold cereal (unsweetened).   · ½ cup hot cereal or mashed potatoes.   · 1 small potato (size of a computer mouse).   ·  cup cooked pasta or rice.   · ½ English muffin.   · 1 cup broth-based soup.   · 3 cups of popcorn.   · 4 to 6 whole-wheat crackers.   · ½ cup cooked beans, peas, or corn.   Vegetable  Each serving equals 5 grams of carbohydrates.  · ½ cup cooked vegetables.   · 1 cup raw vegetables.   · ½ cup  tomato or vegetable juice.   Fruit  Each serving equals 15 grams of carbohydrates.  · 1 small apple or orange.   · 1¼ cup watermelon or strawberries.   · ½ cup applesauce (no sugar added).   · 2 tbs raisins.   · ½ banana.   · ½ cup canned fruit, packed in water, in its own juice, or sweetened with a sugar substitute.   · ½ cup unsweetened fruit juice.   Dairy  Each serving equals 12 to 15 grams of carbohydrates.  · 1 cup fat-free milk.   · 6 oz artificially sweetened yogurt or plain yogurt.   · 1 cup low-fat buttermilk.   · 1 cup soy milk.   Meat/Protein  · 1 large egg.   · 2 to 3 oz meat, poultry, or fish.   · ½ cup low-fat cottage cheese.   · 1 tbs peanut butter.   · ½ cup tofu.   · 1 oz low-fat cheese.   · ¼ cup canned tuna in water.   Fat  · 1 tsp oil.   · 1 tsp trans-fat-free margarine.   · 1 tsp butter.   · 1 tsp mayonnaise.   · 2 tbs avocado.   SAMPLE 2400 CALORIE DIET PLAN  Breakfast  · 1 English muffin (2 carb servings).   · 1 scrambled egg.   · 1 tsp margarine.   · 1 cup fat-free milk (1 carb serving).   · 1 large orange (2 carb servings).   Morning Snack  · ¼ cup low-fat cottage cheese.   · ½ cup canned peaches in juice (1 carb serving).   · 1 cup carrot sticks.   Lunch  · Grilled chicken salad.   · 2 oz chicken breast.   · 1 cup kevin lettuce or spinach.   · ½ cup diced tomato.   · ½ cup shredded carrots.   · ¼ cup sliced cucumbers.   · 2 tbs low-fat salad dressing.   · 2 slices whole-wheat bread (2 carb servings).   · 1 small apple (1 carb serving).   · 1 cup fat-free milk (1 carb serving).   · 15 baked chips ( 1 carb serving).   Afternoon Snack  · 8 reduced fat crackers (2 carb servings).   · 2 tbs peanut butter.   Dinner  · 3 oz salmon, broiled.   · 4½ small red potatoes, roasted with 1 tsp olive oil and seasoning (3 carb servings).   · 1 cup green beans.   · 1¼ cup strawberries (1 carb serving).   · 1 cup fat-free milk (1 carb serving).   Evening Snack  · 6 cups air-popped popcorn (2 carb  servings).   · 2 tbs parmesan cheese sprinkled on top.   · 8 almonds.   MEAL PLAN  Use this worksheet to help you make a daily meal plan based on the 2400 calorie diet suggestions. The total amount of carbohydrates in your meal or snack is more important than making sure you include all of the food groups at every meal or snack. If you are using this plan to help you control your blood glucose, you may interchange carbohydrate-containing foods (dairy, starches, and fruits). Choose a variety of fresh foods of varying colors and flavors. You can choose from the following foods to build your day's meals:  · 12 Starches.   · 4 Vegetables.   · 4 Fruits.   · 3 Dairy.   · 7 oz Meat/Protein.   · Up to 8 Fats.   Your dietician can use this worksheet to help you decide how many servings and what types of foods are right for you.  BREAKFAST  Food Group and Servings / Food Choice  Starch ____________________________________________________  Dairy _____________________________________________________  Fruit _____________________________________________________  Meat/Protein ______________________________________________  Fat________________________________________________________  LUNCH  Food Group and Servings / Food Choice   Starch ___________________________________________________  Meat/Protein _____________________________________________  Vegetable ________________________________________________  Fruit _____________________________________________________  Dairy ____________________________________________________  Fat_______________________________________________________  AFTERNOON SNACK  Food Group and Servings / Food Choice  Starch ___________________________________________________  Meat/Protein _____________________________________________  DINNER  Food Group and Servings / Food Choice  Starch ___________________________________________________  Meat/Protein _____________________________________________  Dairy  ____________________________________________________  Vegetable ________________________________________________  Fruit _____________________________________________________  Fat_______________________________________________________  EVENING SNACK  Food Group and Servings / Food Choice  Fruit _____________________________________________________  Meat/Protein ______________________________________________  Starch ____________________________________________________  DAILY TOTALS  Starch __________________________  Vegetable _______________________  Fruits ___________________________  Dairy ___________________________  Meat/Protein ____________________  Fat _____________________________  Document Released: 07/10/2006 Document Revised: 03/11/2013 Document Reviewed: 11/02/2012  ExitCare® Patient Information ©2013 [employer].Diets for Diabetes, Food Labeling  Look at food labels to help you decide how much of a product you can eat. You will want to check the amount of total carbohydrate in a serving to see how the food fits into your meal plan. In the list of ingredients, the ingredient present in the largest amount by weight must be listed first, followed by the other ingredients in descending order.  STANDARD OF IDENTITY  Most products have a list of ingredients. However, foods that the Food and Drug Administration (FDA) has given a standard of identity do not need a list of ingredients. A standard of identity means that a food must contain certain ingredients if it is called a particular name. Examples are mayonnaise, peanut butter, ketchup, jelly, and cheese.  LABELING TERMS  There are many terms found on food labels. Some of these terms have specific definitions. Some terms are regulated by the FDA, and the FDA has clearly specified how they can be used. Others are not regulated or well-defined and can be misleading and confusing.  SPECIFICALLY DEFINED TERMS  Nutritive Sweetener.  · A sweetener that contains  "calories,such as table sugar or honey.  Nonnutritive Sweetener.  · A sweetener with few or no calories,such as saccharin, aspartame, sucralose, and cyclamate.  LABELING TERMS REGULATED BY THE FDA  Free.  · The product contains only a tiny or small amount of fat, cholesterol, sodium, sugar, or calories. For example, a \"fat-free\" product will contain less than 0.5 g of fat per serving.  Low.  · A food described as \"low\" in fat, saturated fat, cholesterol, sodium, or calories could be eaten fairly often without exceeding dietary guidelines. For example, \"low in fat\" means no more than 3 g of fat per serving.  Lean.  · \"Lean\" and \"extra lean\" are U.S. Department of Agriculture (USDA) terms for use on meat and poultry products. \"Lean\" means the product contains less than 10 g of fat, 4 g of saturated fat, and 95 mg of cholesterol per serving. \"Lean\" is not as low in fat as a product labeled \"low.\"  Extra Lean.  · \"Extra lean\" means the product contains less than 5 g of fat, 2 g of saturated fat, and 95 mg of cholesterol per serving. While \"extra lean\" has less fat than \"lean,\" it is still higher in fat than a product labeled \"low.\"  Reduced, Less, Fewer.  · A diet product that contains 25% less of a nutrient or calories than the regular version. For example, hot dogs might be labeled \"25% less fat than our regular hot dogs.\"  Light/Lite.  · A diet product that contains  fewer calories or ½ the fat of the original. For example, \"light in sodium\" means a product with ½ the usual sodium.  More.  · One serving contains at least 10% more of the daily value of a vitamin, mineral, or fiber than usual.  Good Source Of.  · One serving contains 10% to 19% of the daily value for a particular vitamin, mineral, or fiber.  Excellent Source Of.  · One serving contains 20% or more of the daily value for a particular nutrient. Other terms used might be \"high in\" or \"rich in.\"  Enriched or Fortified.  · The product contains added " "vitamins, minerals, or protein. Nutrition labeling must be used on enriched or fortified foods.  Imitation.  · The product has been altered so that it is lower in protein, vitamins, or minerals than the usual food,such as imitation peanut butter.  Total Fat.  · The number listed is the total of all fat found in a serving of the product. Under total fat, food labels must list saturated fat and trans fat, which are associated with raising bad cholesterol and an increased risk of heart blood vessel disease.  Saturated Fat.  · Mainly fats from animal-based sources. Some examples are red meat, cheese, cream, whole milk, and coconut oil.  Trans Fat.  · Found in some fried snack foods, packaged foods, and fried restaurant foods. It is recommended you eat as close to 0 g of trans fat as possible, since it raises bad cholesterol and lowers good cholesterol.  Polyunsaturated and Monounsaturated Fats.  · More healthful fats. These fats are from plant sources.  Total Carbohydrate.  · The number of carbohydrate grams in a serving of the product. Under total carbohydrate are listed the other carbohydrate sources, such as dietary fiber and sugars.  Dietary Fiber.  · A carbohydrate from plant sources.  Sugars.  · Sugars listed on the label contain all naturally occurring sugars as well as added sugars.  LABELING TERMS NOT REGULATED BY THE FDA  Sugarless.  · Table sugar (sucrose) has not been added. However, the  may use another form of sugar in place of sucrose to poornima the product. For example, sugar alcohols are used to poornima foods. Sugar alcohols are a form of sugar but are not table sugar. If a product contains sugar alcohols in place of sucrose, it can still be labeled \"sugarless.\"  Low Salt, Salt-Free, Unsalted, No Salt, No Salt Added, Without Added Salt.  · Food that is usually processed with salt has been made without salt. However, the food may contain sodium-containing additives, such as preservatives, " leavening agents, or flavorings.  Natural.  · This term has no legal meaning.  Organic.  · Foods that are certified as organic have been inspected and approved by the [employer] to ensure they are produced without pesticides, fertilizers containing synthetic ingredients, bioengineering, or ionizing radiation.  Document Released: 12/20/2004 Document Revised: 03/11/2013 Document Reviewed: 07/08/2010  ExitCare® Patient Information ©2014 [employer] [facility].  Hypoglycemia  Hypoglycemia occurs when the level of sugar (glucose) in the blood is too low. Glucose is a type of sugar that provides the body's main source of energy. Certain hormones (insulin and glucagon) control the level of glucose in the blood. Insulin lowers blood glucose, and glucagon increases blood glucose. Hypoglycemia can result from having too much insulin in the bloodstream, or from not eating enough food that contains glucose.  Hypoglycemia can happen in people who do or do not have diabetes. It can develop quickly, and it can be a medical emergency.  What are the causes?  Hypoglycemia occurs most often in people who have diabetes. If you have diabetes, hypoglycemia may be caused by:  · Diabetes medicine.  · Not eating enough, or not eating often enough.  · Increased physical activity.  · Drinking alcohol, especially when you have not eaten recently.  If you do not have diabetes, hypoglycemia may be caused by:  · A tumor in the pancreas. The pancreas is the organ that makes insulin.  · Not eating enough, or not eating for long periods at a time (fasting).  · Severe infection or illness that affects the liver, heart, or kidneys.  · Certain medicines.  You may also have reactive hypoglycemia. This condition causes hypoglycemia within 4 hours of eating a meal. This may occur after having stomach surgery. Sometimes, the cause of reactive hypoglycemia is not known.  What increases the risk?  Hypoglycemia is more likely to develop in:  · People who have diabetes and  take medicines to lower blood glucose.  · People who abuse alcohol.  · People who have a severe illness.  What are the signs or symptoms?  Hypoglycemia may not cause any symptoms. If you have symptoms, they may include:  · Hunger.  · Anxiety.  · Sweating and feeling clammy.  · Confusion.  · Dizziness or feeling light-headed.  · Sleepiness.  · Nausea.  · Increased heart rate.  · Headache.  · Blurry vision.  · Seizure.  · Nightmares.  · Tingling or numbness around the mouth, lips, or tongue.  · A change in speech.  · Decreased ability to concentrate.  · A change in coordination.  · Restless sleep.  · Tremors or shakes.  · Fainting.  · Irritability.  How is this diagnosed?  Hypoglycemia is diagnosed with a blood test to measure your blood glucose level. This blood test is done while you are having symptoms. Your health care provider may also do a physical exam and review your medical history.  If you do not have diabetes, other tests may be done to find the cause of your hypoglycemia.  How is this treated?  This condition can often be treated by immediately eating or drinking something that contains glucose, such as:  · 3-4 sugar tablets (glucose pills).  · Glucose gel, 15-gram tube.  · Fruit juice, 4 oz (120 mL).  · Regular soda (not diet soda), 4 oz (120 mL).  · Low-fat milk, 4 oz (120 mL).  · Several pieces of hard candy.  · Sugar or honey, 1 Tbsp.  Treating Hypoglycemia If You Have Diabetes   If you are alert and able to swallow safely, follow the 15:15 rule:  · Take 15 grams of a rapid-acting carbohydrate. Rapid-acting options include:  ¨ 1 tube of glucose gel.  ¨ 3 glucose pills.  ¨ 6-8 pieces of hard candy.  ¨ 4 oz (120 mL) of fruit juice.  ¨ 4 oz (120 ml) of regular (not diet) soda.  · Check your blood glucose 15 minutes after you take the carbohydrate.  · If the repeat blood glucose level is still at or below 70 mg/dL (3.9 mmol/L), take 15 grams of a carbohydrate again.  · If your blood glucose level does not  increase above 70 mg/dL (3.9 mmol/L) after 3 tries, seek emergency medical care.  · After your blood glucose level returns to normal, eat a meal or a snack within 1 hour.  Treating Severe Hypoglycemia   Severe hypoglycemia is when your blood glucose level is at or below 54 mg/dL (3 mmol/L). Severe hypoglycemia is an emergency. Do not wait to see if the symptoms will go away. Get medical help right away. Call your local emergency services (911 in the [place] Do not drive yourself to the hospital.  If you have severe hypoglycemia and you cannot eat or drink, you may need an injection of glucagon. A family member or close friend should learn how to check your blood glucose and how to give you a glucagon injection. Ask your health care provider if you need to have an emergency glucagon injection kit available.  Severe hypoglycemia may need to be treated in a hospital. The treatment may include getting glucose through an IV tube. You may also need treatment for the cause of your hypoglycemia.  Follow these instructions at home:  General instructions  · Avoid any diets that cause you to not eat enough food. Talk with your health care provider before you start any new diet.  · Take over-the-counter and prescription medicines only as told by your health care provider.  · Limit alcohol intake to no more than 1 drink per day for nonpregnant women and 2 drinks per day for men. One drink equals 12 oz of beer, 5 oz of wine, or 1½ oz of hard liquor.  · Keep all follow-up visits as told by your health care provider. This is important.  If You Have Diabetes:   · Make sure you know the symptoms of hypoglycemia.  · Always have a rapid-acting carbohydrate snack with you to treat low blood sugar.  · Follow your diabetes management plan, as told by your health care provider. Make sure you:  ¨ Take your medicines as directed.  ¨ Follow your exercise plan.  ¨ Follow your meal plan. Eat on time, and do not skip meals.  ¨ Check your blood  glucose as often as directed. Make sure to check your blood glucose before and after exercise. If you exercise longer or in a different way than usual, check your blood glucose more often.  ¨ Follow your sick day plan whenever you cannot eat or drink normally. Make this plan in advance with your health care provider.  · Share your diabetes management plan with people in your workplace, school, and household.  · Check your urine for ketones when you are ill and as told by your health care provider.  · Carry a medical alert card or wear medical alert jewelry.  If You Have Reactive Hypoglycemia or Low Blood Sugar From Other Causes:  · Monitor your blood glucose as told by your health care provider.  · Follow instructions from your health care provider about eating or drinking restrictions.  Contact a health care provider if:  · You have problems keeping your blood glucose in your target range.  · You have frequent episodes of hypoglycemia.  Get help right away if:  · You continue to have hypoglycemia symptoms after eating or drinking something containing glucose.  · Your blood glucose is at or below 54 mg/dL (3 mmol/L).  · You have a seizure.  · You faint.  These symptoms may represent a serious problem that is an emergency. Do not wait to see if the symptoms will go away. Get medical help right away. Call your local emergency services (911 in the [place] Do not drive yourself to the hospital.   This information is not intended to replace advice given to you by your health care provider. Make sure you discuss any questions you have with your health care provider.  Document Released: 12/18/2006 Document Revised: 05/31/2017 Document Reviewed: 01/20/2017  [employer] Interactive Patient Education © 2017 [employer]  Diabetes and Foot Care  Diabetes may cause you to have problems because of poor blood supply (circulation) to your feet and legs. This may cause the skin on your feet to become thinner, break easier, and heal  more slowly. Your skin may become dry, and the skin may peel and crack. You may also have nerve damage in your legs and feet causing decreased feeling in them. You may not notice minor injuries to your feet that could lead to infections or more serious problems. Taking care of your feet is one of the most important things you can do for yourself.  Follow these instructions at home:  · Wear shoes at all times, even in the house. Do not go barefoot. Bare feet are easily injured.  · Check your feet daily for blisters, cuts, and redness. If you cannot see the bottom of your feet, use a mirror or ask someone for help.  · Wash your feet with warm water (do not use hot water) and mild soap. Then pat your feet and the areas between your toes until they are completely dry. Do not soak your feet as this can dry your skin.  · Apply a moisturizing lotion or petroleum jelly (that does not contain alcohol and is unscented) to the skin on your feet and to dry, brittle toenails. Do not apply lotion between your toes.  · Trim your toenails straight across. Do not dig under them or around the cuticle. File the edges of your nails with an emery board or nail file.  · Do not cut corns or calluses or try to remove them with medicine.  · Wear clean socks or stockings every day. Make sure they are not too tight. Do not wear knee-high stockings since they may decrease blood flow to your legs.  · Wear shoes that fit properly and have enough cushioning. To break in new shoes, wear them for just a few hours a day. This prevents you from injuring your feet. Always look in your shoes before you put them on to be sure there are no objects inside.  · Do not cross your legs. This may decrease the blood flow to your feet.  · If you find a minor scrape, cut, or break in the skin on your feet, keep it and the skin around it clean and dry. These areas may be cleansed with mild soap and water. Do not cleanse the area with peroxide, alcohol, or  iodine.  · When you remove an adhesive bandage, be sure not to damage the skin around it.  · If you have a wound, look at it several times a day to make sure it is healing.  · Do not use heating pads or hot water bottles. They may burn your skin. If you have lost feeling in your feet or legs, you may not know it is happening until it is too late.  · Make sure your health care provider performs a complete foot exam at least annually or more often if you have foot problems. Report any cuts, sores, or bruises to your health care provider immediately.  Contact a health care provider if:  · You have an injury that is not healing.  · You have cuts or breaks in the skin.  · You have an ingrown nail.  · You notice redness on your legs or feet.  · You feel burning or tingling in your legs or feet.  · You have pain or cramps in your legs and feet.  · Your legs or feet are numb.  · Your feet always feel cold.  Get help right away if:  · There is increasing redness, swelling, or pain in or around a wound.  · There is a red line that goes up your leg.  · Pus is coming from a wound.  · You develop a fever or as directed by your health care provider.  · You notice a bad smell coming from an ulcer or wound.  This information is not intended to replace advice given to you by your health care provider. Make sure you discuss any questions you have with your health care provider.  Document Released: 12/15/2001 Document Revised: 05/25/2017 Document Reviewed: 05/27/2014  [employer] Interactive Patient Education © 2017 [employer]  Diabetes, Frequently Asked Questions  WHAT IS DIABETES?  Most of the food we eat is turned into glucose (sugar). Our bodies use it for energy. The pancreas makes a hormone called insulin. It helps glucose get into the cells of our bodies. When you have diabetes, your body either does not make enough insulin or cannot use its own insulin as well as it should. This causes sugars to build up in your blood.  WHAT  ARE THE SYMPTOMS OF DIABETES?  · Frequent urination.   · Excessive thirst.   · Unexplained weight loss.   · Extreme hunger.   · Blurred vision.   · Tingling or numbness in hands or feet.   · Feeling very tired much of the time.   · Dry, itchy skin.   · Sores that are slow to heal.   · Yeast infections.   WHAT ARE THE TYPES OF DIABETES?  Type 1 Diabetes   · About 10% of affected people have this type.   · Usually occurs before the age of 30.   · Usually occurs in thin to normal weight people.   Type 2 Diabetes  · About 90% of affected people have this type.   · Usually occurs after the age of 40.   · Usually occurs in overweight people.   · More likely to have:   · A family history of diabetes.   · A history of diabetes during pregnancy (gestational diabetes).   · High blood pressure.   · High cholesterol and triglycerides.   Gestational Diabetes  · Occurs in about 4% of pregnancies.   · Usually goes away after the baby is born.   · More likely to occur in women with:   · Family history of diabetes.   · Previous gestational diabetes.   · Obese.   · Over 25 years old.   WHAT IS PRE-DIABETES?  Pre-diabetes means your blood glucose is higher than normal, but lower than the diabetes range. It also means you are at risk of getting type 2 diabetes and heart disease. If you are told you have pre-diabetes, have your blood glucose checked again in 1 to 2 years.  WHAT IS THE TREATMENT FOR DIABETES?  Treatment is aimed at keeping blood glucose near normal levels at all times. Learning how to manage this yourself is important in treating diabetes. Depending on the type of diabetes you have, your treatment will include one or more of the following:  · Monitoring your blood glucose.   · Meal planning.   · Exercise.   · Oral medicine (pills) or insulin.   CAN DIABETES BE PREVENTED?  With type 1 diabetes, prevention is more difficult, because the triggers that cause it are not yet known.  With type 2 diabetes, prevention is more  likely, with lifestyle changes:  · Maintain a healthy weight.   · Eat healthy.   · Exercise.   IS THERE A CURE FOR DIABETES?  No, there is no cure for diabetes. There is a lot of research going on that is looking for a cure, and progress is being made. Diabetes can be treated and controlled. People with diabetes can manage their diabetes and lead normal, active lives.  SHOULD I BE TESTED FOR DIABETES?  If you are at least 45 years old, you should be tested for diabetes. You should be tested again every 3 years. If you are 45 or older and overweight, you may want to get tested more often. If you are younger than 45, overweight, and have one or more of the following risk factors, you should be tested:  · Family history of diabetes.   · Inactive lifestyle.   · High blood pressure.   WHAT ARE SOME OTHER SOURCES FOR INFORMATION ON DIABETES?  The following organizations may help in your search for more information on diabetes:  National Diabetes Education Program (NDEP)  Internet: http://www.ndep.nih.gov/resources  American Diabetes Association  Internet: http://www.diabetes.org   Juvenile Diabetes Foundation International  Internet: http://www.jdf.org  Document Released: 12/20/2004 Document Revised: 03/11/2013 Document Reviewed: 10/15/2010  ExitCare® Patient Information ©2013 [employer].  Red Yeast Rice capsules  What is this medicine?  RED YEAST RICE (red yeest rahys) is intended to be used by healthy adults to help lower blood cholesterol in conjunction with a healthy diet and a regular exercise program. The FDA has not approved this supplement for any medical use. If medical treatment is needed for cholesterol control or any other disease, you should contact your doctor or health care professional regarding the use of this product.  This supplement may be used for other purposes; ask your health care provider or pharmacist if you have questions.  This medicine may be used for other purposes; ask your health care  provider or pharmacist if you have questions.  COMMON BRAND NAME(S): Red Yeast Rice  What should I tell my health care provider before I take this medicine?  They need to know if you have any of these conditions:  -frequently drink alcoholic beverages  -kidney disease  -liver disease  -muscle aches or weakness  -other medical condition  -an unusual or allergic reaction to red yeast rice, went yeast, lovastatin, other 'statin' medications, other medicines, foods, dyes, or preservatives  -pregnant or trying to get pregnant  -breast-feeding  How should I use this medicine?  Take this supplement by mouth with a glass of water. Follow the directions on the package labeling, or take as directed by your health care professional. Do not take this supplement more often than directed.  Contact your pediatrician or health care professional regarding the use of this supplement in children. Special care may be needed. This supplement is not recommended for use in children.  Overdosage: If you think you have taken too much of this medicine contact a poison control center or emergency room at once.  NOTE: This medicine is only for you. Do not share this medicine with others.  What if I miss a dose?  If you miss a dose, take it as soon as you can. If it is almost time for your next dose, take only that dose. Do not take double or extra doses.  What may interact with this medicine?  Do not take this medicine with any of the following medications:  -clarithromycin  -delavirdine  -erythromycin  -grapefruit juice  -protease inhibitors used to treat HIV infection  -medicines for fungal infections like itraconazole, ketoconazole, posaconazole, and voriconazole  -mibefradil  -nefazodone  -other medicines for high cholesterol  -telithromycin  -troleandomycin  This medicine may also interact with the following  medications:  -alcohol  -amiodarone  -colchicine  -cyclosporine  -danazol  -diltiazem  -fenofibrate  -fluconazole  -gemfibrozil  -mifepristone, RU-486  -niacin  -Jv's wort  -verapamil  -voriconazole  -warfarin  This list may not describe all possible interactions. Give your health care provider a list of all the medicines, herbs, non-prescription drugs, or dietary supplements you use. Also tell them if you smoke, drink alcohol, or use illegal drugs. Some items may interact with your medicine.  What should I watch for while using this medicine?  Visit your doctor or health care professional for regular check-ups. You may need regular tests to make sure your liver is working properly.  Tell you doctor or health care professional right away if you get any unexplained muscle pain, tenderness, or weakness, especially if you also have a fever and tiredness.  Some drugs may increase the risk of side effects from this supplement. If you are given certain antibiotics or antifungals, you should stop taking this supplement during those treatments. Check with your doctor or pharmacist for advice.  If you are scheduled for any medical or dental procedure, tell your healthcare provider that you are taking this supplement. You may need to stop taking this supplement before the procedure.  Do not use this drug if you are pregnant or breast-feeding. Serious side effects to an unborn child or to an infant are possible. Talk to your doctor or pharmacist for more information.  Herbal or dietary supplements are not regulated like medicines. Rigid  standards are not required for dietary supplements. The purity and strength of these products can vary. The safety and effect of this dietary supplement for a certain disease or illness is not well known. This product is not intended to diagnose, treat, cure or prevent any disease.  The Food and Drug Administration suggests the following to help consumers protect  themselves:  -Always read product labels and follow directions.  -Natural does not mean a product is safe for humans to take.  -Look for products that include USP after the ingredient name. This means that the  followed the standards of the [place] Pharmacopoeia.  -Supplements made or sold by a nationally known food or drug company are more likely to be made under tight controls. You can write to the company for more information about how the product was made.  What side effects may I notice from receiving this medicine?  Side effects that you should report to your doctor or health care professional as soon as possible:  -allergic reactions like skin rash, itching or hives, swelling of the face, lips, or tongue  -dark urine  -fever  -joint pain  -muscle cramps, pain  -redness, blistering, peeling or loosening of the skin, including inside the mouth  -trouble passing urine or change in the amount of urine  -unusually weak or tired  -yellowing of the eyes or skin  Side effects that usually do not require medical attention (report to your doctor or health care professional if they continue or are bothersome):  -constipation  -headache  -stomach gas, pain, upset  -nausea  -trouble sleeping  This list may not describe all possible side effects. Call your doctor for medical advice about side effects. You may report side effects to FDA at 0-770-FDA-3060.  Where should I keep my medicine?  Keep out of the reach of children.  Store at room temperature between 15 and 30 degrees C (59 and 86 degrees F). Throw away any unused medicine after the expiration date.  NOTE: This sheet is a summary. It may not cover all possible information. If you have questions about this medicine, talk to your doctor, pharmacist, or health care provider.  © 2018 [employer]/Gold Standard (2015-08-25 10:26:14)    Fish Oil, Omega-3 Fatty Acids capsules (OTC)  What is this medicine?  FISH OIL, OMEGA-3 FATTY ACIDS (Fish Oil, oh MAY ga - 3 fatty AS  ids) are essential fats. It is promoted to help support a healthy heart. This dietary supplement is used to add to a healthy diet. The FDA has not approved this supplement for any medical use.  This supplement may be used for other purposes; ask your health care provider or pharmacist if you have questions.  This medicine may be used for other purposes; ask your health care provider or pharmacist if you have questions.  COMMON BRAND NAME(S): Magna [place]-3, SoshiGames [place]-3 1450, Patrick Blue Omega, Patrick Blue Professional [place]-3 2100, Omega-3, Omega-3 Krill Oil, Ovega-3, Sea-Omega, TherOmega  What should I tell my health care provider before I take this medicine?  They need to know if you have any of these conditions  -bleeding problems  -lung or breathing disease, like asthma  -an unusual or allergic reaction to fish oil, omega-3 fatty acids, fish, other medicines, foods, dyes, or preservatives  -pregnant or trying to get pregnant  -breast-feeding  How should I use this medicine?  Take this medicine by mouth with a glass of water. Follow the directions on the package or prescription label. Take with food. Take your medicine at regular intervals. Do not take your medicine more often than directed.  Talk to your pediatrician regarding the use of this medicine in children. Special care may be needed. This medicine should not be used in children without a doctor's advice.  Overdosage: If you think you have taken too much of this medicine contact a poison control center or emergency room at once.  NOTE: This medicine is only for you. Do not share this medicine with others.  What if I miss a dose?  If you miss a dose, take it as soon as you can. If it is almost time for your next dose, take only that dose. Do not take double or extra doses.  What may interact with this medicine?  -aspirin and aspirin-like medicines  -herbal products like danshen, dong quai, garlic pills, art, ginkgo biloba, horse  chestnut, willow bark, and others  -medicines that treat or prevent blood clots like enoxaparin, heparin, warfarin  This list may not describe all possible interactions. Give your health care provider a list of all the medicines, herbs, non-prescription drugs, or dietary supplements you use. Also tell them if you smoke, drink alcohol, or use illegal drugs. Some items may interact with your medicine.  What should I watch for while using this medicine?  Follow a good diet and exercise plan. Taking a dietary supplement does not replace a healthy lifestyle. Some foods that have omega-3 fatty acids naturally are fatty fish like albacore tuna, halibut, herring, mackerel, lake trout, salmon, and sardines.  Too much of this supplement can be unsafe. Talk to your doctor or health care provider about how much of this supplement is right for you.  If you are scheduled for any medical or dental procedure, tell your healthcare provider that you are taking this medicine. You may need to stop taking this medicine before the procedure.  Herbal or dietary supplements are not regulated like medicines. Rigid  standards are not required for dietary supplements. The purity and strength of these products can vary. The safety and effect of this dietary supplement for a certain disease or illness is not well known. This product is not intended to diagnose, treat, cure or prevent any disease.  The Food and Drug Administration suggests the following to help consumers protect themselves:  -Always read product labels and follow directions.  -Natural does not mean a product is safe for humans to take.  -Look for products that include USP after the ingredient name. This means that the  followed the standards of the [place] Pharmacopoeia.  -Supplements made or sold by a nationally known food or drug company are more likely to be made under tight controls. You can write to the company for more information about how the  product was made.  What side effects may I notice from receiving this medicine?  Side effects that you should report to your doctor or health care professional as soon as possible:  -allergic reactions like skin rash, itching or hives, swelling of the face, lips, or tongue  -breathing problems  -changes in your moods or emotions  -unusual bleeding or bruising  Side effects that usually do not require medical attention (report to your doctor or health care professional if they continue or are bothersome):  -bad or fishy breath  -belching  -diarrhea  -nausea  -stomach gas, upset  -weight gain  This list may not describe all possible side effects. Call your doctor for medical advice about side effects. You may report side effects to FDA at 7-229-FDA-5759.  Where should I keep my medicine?  Keep out of the reach of children.  Store at room temperature or as directed on the package label. Protect from moisture. Do not freeze. Throw away any unused medicine after the expiration date.  NOTE: This sheet is a summary. It may not cover all possible information. If you have questions about this medicine, talk to your doctor, pharmacist, or health care provider.  © 2018 Elsevier/Gold Standard (2016-04-07 09:36:32)

## 2019-07-01 NOTE — PROGRESS NOTES
cc: Type 2 diabetes new onset, dyslipidemia,    Subjective:     Luna Carpio is a 63 y.o. female presenting She did go for colonoscopy and they did remove some polyps with some concern in her rectal region and they did recommend to come back in 3 years.  Her blood pressure is doing a lot better today.  She has not got the back and hip x-ray yet.  She takes Aleve once a week and ibuprofen every other day for her left hip pain which is worse than her right.  She did try the muscle relaxer but that was too sedating and made her to relax.  Her allergies are doing better.  She did lose about 5 pounds since I last saw her but she thinks it could be from the colonoscopy as well too.  Complete blood count within normal limits, complete metabolic panel within normal limits, kidney GFR within normal limits, STD screen chlamydia and gonorrhea negative, A1c 6.5.  TSH thyroid within normal limits, lipid panel with LDL high at 124 and total cholesterol mildly high at 206.  Vitamin D 49 within normal limits, vitamin B12 538, rest of STD screening HIV and hepatitis negative.  She is currently supplementing with vitamin D and B12.  She has been prediabetic before.  She saw an eye doctor in November and he wants to see her back in 1 year.  Bilateral hips are still hurting her left more than right on certain days she still exercises and no recent trauma and normally heat compress and ibuprofen will help but she would like to have less ibuprofen and would like to consider a hip injection.    Review of systems:     Constitutional: Negative for fever, chills and positive fatigue.   HENT: Negative for sinus pressure  Eyes: Negative for blurriness, negative for double vision  Respiratory: Negative for cough and shortness of breath, negative for exertional shortness of breath  Cardiovascular: Negative for leg swelling, negative for palpitations, negative for chest pain  Gastrointestinal: Negative for nausea, vomiting, abdominal pain,  "constipation and diarrhea.  Genitourinary: Negative for dysuria and hematuria.   Skin: Negative for rash.   Neurological: Negative for dizziness, focal weakness  Endo/Heme/Allergies: Denies bleeding, bruising      Current Outpatient Prescriptions:   •  atorvastatin (LIPITOR) 20 MG Tab, Take 1 Tab by mouth every evening., Disp: 90 Tab, Rfl: 1  •  metFORMIN (GLUCOPHAGE) 500 MG Tab, Take 1 Tab by mouth 2 times a day, with meals., Disp: 180 Tab, Rfl: 0  •  ibuprofen (MOTRIN) 600 MG Tab, Take 1 Tab by mouth every 8 hours as needed., Disp: 60 Tab, Rfl: 1    Allergies, past medical history, past surgical history, family history, social history reviewed and updated    Objective:     Vitals: /78 (BP Location: Left arm, Patient Position: Sitting, BP Cuff Size: Adult)   Pulse 72   Temp 36.9 °C (98.5 °F) (Temporal)   Resp 12   Ht 1.753 m (5' 9\")   Wt 71.8 kg (158 lb 6.4 oz)   SpO2 98%   BMI 23.39 kg/m²   General: Alert, pleasant, NAD  HEENT: Normocephalic.  Nontraumatic. EOMI, no icterus or pallor.  Conjunctivae and lids normal. External ears normal.   Heart: Regular rate and rhythm.  S1 and S2 normal.  No murmurs appreciated.  Respiratory: Normal respiratory effort.  Clear to auscultation bilaterally.  Abdomen: Non-distended, soft, non tender in all 4 quadrants.  Skin: Warm, dry, no rashes.  Musculoskeletal: Gait is normal.  Moves all extremities well.  Extremities: No leg edema.   Psych:  Affect/mood is normal, judgement is good, memory is intact, grooming is appropriate.    Assessment/Plan:     Diagnoses and all orders for this visit:    Type 2 diabetes mellitus without complication, without long-term current use of insulin (HCC)  -     metFORMIN (GLUCOPHAGE) 500 MG Tab; Take 1 Tab by mouth 2 times a day, with meals.    Dyslipidemia  -     atorvastatin (LIPITOR) 20 MG Tab; Take 1 Tab by mouth every evening.    Pain of both hip joints    -Complete blood count within normal limits, complete metabolic panel within " normal limits, kidney GFR within normal limits, STD screen chlamydia and gonorrhea negative, A1c 6.5.  TSH thyroid within normal limits, lipid panel with LDL high at 124 and total cholesterol mildly high at 206.  Vitamin D 49 within normal limits, vitamin B12 538, rest of STD screening HIV and hepatitis negative.  She is currently supplementing with vitamin D and B12.  -For new onset diabetes A1c 6.5 we will start her on low-dose metformin 500 mg twice a day with meals this can initially cause some loose stools or diarrhea but that should go away after couple of days.  Also take atorvastatin cholesterol medication at night daily for her cholesterol and she could do over-the-counter red yeast and or omega-3 fatty acid fish oil twice a day with meals.  Please read patient instruction section on cholesterol and new medications and frequently asked questions about diabetes and hypoglycemic precautions and always keep some sugar candy or snack on you in case you feel low sugar.  Drink enough water as well to stay hydrated.  She is [place] seen the eye doctor and will follow-up in 1 year.  Please check with the patient instructions on diabetes and foot care and check your feet daily to avoid any infections.  Please use ibuprofen sparingly for hips and then when she returns back from her trip and gets the x-ray done we will do a left hip injection on her return.  She will work on her diet and exercise and can try some apps if you want as lose it or my fitness tracker to keep track of what she is eating only thing is they do not restrict for fruits to make sure not having too much fruits as that has a lot of sugar and look at food labels which is in the patient instruction section and she will work on her diabetes and we will recheck her A1c in 3 months and if she still not improving then we will send her to the diabetic nurse but for now she will try on her own with her diet and exercise and keep track of what she is eating  and reduce her sugars and fried food at the [employer] parties.  She has been prediabetic in the past but never diabetic and so we will work on this and try for a 5 pound weight loss and to work on nutritious snacks and diets with portion control and limiting certain carbs.  Drink plenty of water and we will see her back in a few weeks for her hip injection and then in 3 months for her diabetes recheck.    Return in about 3 months (around 10/1/2019), or new diabetes/meds/A1C/ 3 weeks for L hip injection procedure, for Diabetes.

## 2019-07-02 NOTE — PATIENT COMMUNICATION
LVM for pt, let her know I have sent her a my chart message with x ray results. Asked pt to call if she has questions.

## 2019-11-18 ENCOUNTER — OFFICE VISIT (OUTPATIENT)
Dept: MEDICAL GROUP | Facility: PHYSICIAN GROUP | Age: 64
End: 2019-11-18
Payer: COMMERCIAL

## 2019-11-18 VITALS
OXYGEN SATURATION: 95 % | BODY MASS INDEX: 22.96 KG/M2 | TEMPERATURE: 97.7 F | DIASTOLIC BLOOD PRESSURE: 78 MMHG | WEIGHT: 155 LBS | HEART RATE: 75 BPM | HEIGHT: 69 IN | SYSTOLIC BLOOD PRESSURE: 108 MMHG | RESPIRATION RATE: 14 BRPM

## 2019-11-18 DIAGNOSIS — Z51.81 MEDICATION MONITORING ENCOUNTER: ICD-10-CM

## 2019-11-18 DIAGNOSIS — K63.5: ICD-10-CM

## 2019-11-18 DIAGNOSIS — Z12.31 ENCOUNTER FOR SCREENING MAMMOGRAM FOR MALIGNANT NEOPLASM OF BREAST: ICD-10-CM

## 2019-11-18 DIAGNOSIS — Z23 NEED FOR VACCINATION: ICD-10-CM

## 2019-11-18 DIAGNOSIS — R42 DIZZY: ICD-10-CM

## 2019-11-18 DIAGNOSIS — E11.9 TYPE 2 DIABETES MELLITUS WITHOUT COMPLICATION, WITHOUT LONG-TERM CURRENT USE OF INSULIN (HCC): ICD-10-CM

## 2019-11-18 DIAGNOSIS — L20.82 FLEXURAL ECZEMA: ICD-10-CM

## 2019-11-18 DIAGNOSIS — E78.5 DYSLIPIDEMIA: ICD-10-CM

## 2019-11-18 PROCEDURE — 90471 IMMUNIZATION ADMIN: CPT | Performed by: FAMILY MEDICINE

## 2019-11-18 PROCEDURE — 90732 PPSV23 VACC 2 YRS+ SUBQ/IM: CPT | Performed by: FAMILY MEDICINE

## 2019-11-18 PROCEDURE — 99214 OFFICE O/P EST MOD 30 MIN: CPT | Mod: 25 | Performed by: FAMILY MEDICINE

## 2019-11-18 RX ORDER — BETAMETHASONE DIPROPIONATE 0.05 %
OINTMENT (GRAM) TOPICAL
Qty: 1 TUBE | Refills: 1 | Status: SHIPPED | OUTPATIENT
Start: 2019-11-18 | End: 2019-12-02

## 2019-11-18 NOTE — PATIENT INSTRUCTIONS
Diagnoses and all orders for this visit:    Encounter for screening mammogram for malignant neoplasm of breast  -     MA-SCREENING MAMMO BILAT W/TOMOSYNTHESIS W/CAD; Future    Polyp, intestinal    Need for vaccination  -     Pneumococal Polysaccharide Vaccine 23-Valent =>1YO SQ/IM    Type 2 diabetes mellitus without complication, without long-term current use of insulin (HCC)  -     Diabetic Monofilament Lower Extremity Exam  -     MICROALBUMIN CREAT RATIO URINE (LAB COLLECT); Future  -     Comp Metabolic Panel; Future  -     HEMOGLOBIN A1C; Future    Dizzy    Dyslipidemia  -     Lipid Profile; Future    Medication monitoring encounter  -     CBC WITH DIFFERENTIAL; Future  -     Comp Metabolic Panel; Future  -     HEMOGLOBIN A1C; Future    Flexural eczema  -     betamethasone dipropionate (DIPROLENE) 0.05 % Ointment; Apply thin layer bilaterally as needed to affected area. 30 g tube    At least 1 week before you see me please get fasting lab work 8 hours of no eating but drink plenty of water.  Her monofilament testing was within normal limits.  She will call and get her breast cancer screening done.  We will give her the pneumonia vaccine today.  She does appear to be having some hypoglycemic events but she wants to wait to do the lab work and then we may be able to reduce her get her off metformin as her last A1c was 6.5 and she is been working on her diet and exercise.  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  She is not needing the ibuprofen that often.  For dry skin and eczema please try for prevention CeraVe cream with salicylic acid over-the-counter or online the cream with salicylic acid twice daily and for flareups you could use the betamethasone steroid ointment but because of the steroid he could think  the skin so do not use for more than 7 days at a time for flareups.  And please use mild detergents and mild soaps and sunscreen.  Otherwise we will do her lab work and we will wait to see what that shows before we make any adjustments for her diabetes.  Otherwise her hips are doing better and no need for any steroid injections.  Her colonoscopy was already done and there was a polyp and so she is going to be going back every 3 years.  And she is not due for her Pap smear for another year as this is every 3 years and she had a done 2 years ago.    Return in about 25 days (around 12/13/2019), or FU labs/diabetes.    Hypoglycemia  Hypoglycemia occurs when the level of sugar (glucose) in the blood is too low. Glucose is a type of sugar that provides the body's main source of energy. Certain hormones (insulin and glucagon) control the level of glucose in the blood. Insulin lowers blood glucose, and glucagon increases blood glucose. Hypoglycemia can result from having too much insulin in the bloodstream, or from not eating enough food that contains glucose.  Hypoglycemia can happen in people who do or do not have diabetes. It can develop quickly, and it can be a medical emergency.  What are the causes?  Hypoglycemia occurs most often in people who have diabetes. If you have diabetes, hypoglycemia may be caused by:  · Diabetes medicine.  · Not eating enough, or not eating often enough.  · Increased physical activity.  · Drinking alcohol, especially when you have not eaten recently.  If you do not have diabetes, hypoglycemia may be caused by:  · A tumor in the pancreas. The pancreas is the organ that makes insulin.  · Not eating enough, or not eating for long periods at a time (fasting).  · Severe infection or illness that affects the liver, heart, or kidneys.  · Certain medicines.  You may also have reactive hypoglycemia. This condition causes hypoglycemia within 4 hours of eating a meal. This may occur after having  stomach surgery. Sometimes, the cause of reactive hypoglycemia is not known.  What increases the risk?  Hypoglycemia is more likely to develop in:  · People who have diabetes and take medicines to lower blood glucose.  · People who abuse alcohol.  · People who have a severe illness.  What are the signs or symptoms?  Hypoglycemia may not cause any symptoms. If you have symptoms, they may include:  · Hunger.  · Anxiety.  · Sweating and feeling clammy.  · Confusion.  · Dizziness or feeling light-headed.  · Sleepiness.  · Nausea.  · Increased heart rate.  · Headache.  · Blurry vision.  · Seizure.  · Nightmares.  · Tingling or numbness around the mouth, lips, or tongue.  · A change in speech.  · Decreased ability to concentrate.  · A change in coordination.  · Restless sleep.  · Tremors or shakes.  · Fainting.  · Irritability.  How is this diagnosed?  Hypoglycemia is diagnosed with a blood test to measure your blood glucose level. This blood test is done while you are having symptoms. Your health care provider may also do a physical exam and review your medical history.  If you do not have diabetes, other tests may be done to find the cause of your hypoglycemia.  How is this treated?  This condition can often be treated by immediately eating or drinking something that contains glucose, such as:  · 3-4 sugar tablets (glucose pills).  · Glucose gel, 15-gram tube.  · Fruit juice, 4 oz (120 mL).  · Regular soda (not diet soda), 4 oz (120 mL).  · Low-fat milk, 4 oz (120 mL).  · Several pieces of hard candy.  · Sugar or honey, 1 Tbsp.  Treating Hypoglycemia If You Have Diabetes   If you are alert and able to swallow safely, follow the 15:15 rule:  · Take 15 grams of a rapid-acting carbohydrate. Rapid-acting options include:  ¨ 1 tube of glucose gel.  ¨ 3 glucose pills.  ¨ 6-8 pieces of hard candy.  ¨ 4 oz (120 mL) of fruit juice.  ¨ 4 oz (120 ml) of regular (not diet) soda.  · Check your blood glucose 15 minutes after you take  the carbohydrate.  · If the repeat blood glucose level is still at or below 70 mg/dL (3.9 mmol/L), take 15 grams of a carbohydrate again.  · If your blood glucose level does not increase above 70 mg/dL (3.9 mmol/L) after 3 tries, seek emergency medical care.  · After your blood glucose level returns to normal, eat a meal or a snack within 1 hour.  Treating Severe Hypoglycemia   Severe hypoglycemia is when your blood glucose level is at or below 54 mg/dL (3 mmol/L). Severe hypoglycemia is an emergency. Do not wait to see if the symptoms will go away. Get medical help right away. Call your local emergency services (911 in the [place] Do not drive yourself to the hospital.  If you have severe hypoglycemia and you cannot eat or drink, you may need an injection of glucagon. A family member or close friend should learn how to check your blood glucose and how to give you a glucagon injection. Ask your health care provider if you need to have an emergency glucagon injection kit available.  Severe hypoglycemia may need to be treated in a hospital. The treatment may include getting glucose through an IV tube. You may also need treatment for the cause of your hypoglycemia.  Follow these instructions at home:  General instructions  · Avoid any diets that cause you to not eat enough food. Talk with your health care provider before you start any new diet.  · Take over-the-counter and prescription medicines only as told by your health care provider.  · Limit alcohol intake to no more than 1 drink per day for nonpregnant women and 2 drinks per day for men. One drink equals 12 oz of beer, 5 oz of wine, or 1½ oz of hard liquor.  · Keep all follow-up visits as told by your health care provider. This is important.  If You Have Diabetes:   · Make sure you know the symptoms of hypoglycemia.  · Always have a rapid-acting carbohydrate snack with you to treat low blood sugar.  · Follow your diabetes management plan, as told by your health  care provider. Make sure you:  ¨ Take your medicines as directed.  ¨ Follow your exercise plan.  ¨ Follow your meal plan. Eat on time, and do not skip meals.  ¨ Check your blood glucose as often as directed. Make sure to check your blood glucose before and after exercise. If you exercise longer or in a different way than usual, check your blood glucose more often.  ¨ Follow your sick day plan whenever you cannot eat or drink normally. Make this plan in advance with your health care provider.  · Share your diabetes management plan with people in your workplace, school, and household.  · Check your urine for ketones when you are ill and as told by your health care provider.  · Carry a medical alert card or wear medical alert jewelry.  If You Have Reactive Hypoglycemia or Low Blood Sugar From Other Causes:  · Monitor your blood glucose as told by your health care provider.  · Follow instructions from your health care provider about eating or drinking restrictions.  Contact a health care provider if:  · You have problems keeping your blood glucose in your target range.  · You have frequent episodes of hypoglycemia.  Get help right away if:  · You continue to have hypoglycemia symptoms after eating or drinking something containing glucose.  · Your blood glucose is at or below 54 mg/dL (3 mmol/L).  · You have a seizure.  · You faint.  These symptoms may represent a serious problem that is an emergency. Do not wait to see if the symptoms will go away. Get medical help right away. Call your local emergency services (911 in the [place] Do not drive yourself to the hospital.   This information is not intended to replace advice given to you by your health care provider. Make sure you discuss any questions you have with your health care provider.      Neck Exercises  Neck exercises can be important for many reasons:  · They can help you to improve and maintain flexibility in your neck. This can be especially important as you  age.  · They can help to make your neck stronger. This can make movement easier.  · They can reduce or prevent neck pain.  · They may help your upper back.  Ask your health care provider which neck exercises would be best for you.  Exercises  Neck Press   Repeat this exercise 10 times. Do it first thing in the morning and right before bed or as told by your health care provider.  1. Lie on your back on a firm bed or on the floor with a pillow under your head.  2. Use your neck muscles to push your head down on the pillow and straighten your spine.  3. Hold the position as well as you can. Keep your head facing up and your chin tucked.  4. Slowly count to 5 while holding this position.  5. Relax for a few seconds. Then repeat.  Isometric Strengthening   Do a full set of these exercises 2 times a day or as told by your health care provider.  1. Sit in a supportive chair and place your hand on your forehead.  2. Push forward with your head and neck while pushing back with your hand. Hold for 10 seconds.  3. Relax. Then repeat the exercise 3 times.  4. Next, do the sequence again, this time putting your hand against the back of your head. Use your head and neck to push backward against the hand pressure.  5. Finally, do the same exercise on either side of your head, pushing sideways against the pressure of your hand.  Prone Head Lifts   Repeat this exercise 5 times. Do this 2 times a day or as told by your health care provider.  1. Lie face-down, resting on your elbows so that your chest and upper back are raised.  2. Start with your head facing downward, near your chest. Position your chin either on or near your chest.  3. Slowly lift your head upward. Lift until you are looking straight ahead. Then continue lifting your head as far back as you can stretch.  4. Hold your head up for 5 seconds. Then slowly lower it to your starting position.  Supine Head Lifts   Repeat this exercise 8-10 times. Do this 2 times a day or  as told by your health care provider.  1. Lie on your back, bending your knees to point to the ceiling and keeping your feet flat on the floor.  2. Lift your head slowly off the floor, raising your chin toward your chest.  3. Hold for 5 seconds.  4. Relax and repeat.  Scapular Retraction   Repeat this exercise 5 times. Do this 2 times a day or as told by your health care provider.  1. Stand with your arms at your sides. Look straight ahead.  2. Slowly pull both shoulders backward and downward until you feel a stretch between your shoulder blades in your upper back.  3. Hold for 10-30 seconds.  4. Relax and repeat.  Contact a health care provider if:  · Your neck pain or discomfort gets much worse when you do an exercise.  · Your neck pain or discomfort does not improve within 2 hours after you exercise.  If you have any of these problems, stop exercising right away. Do not do the exercises again unless your health care provider says that you can.  Get help right away if:  · You develop sudden, severe neck pain. If this happens, stop exercising right away. Do not do the exercises again unless your health care provider says that you can.  Exercises  Neck Stretch   Repeat this exercise 3-5 times.  1. Do this exercise while standing or while sitting in a chair.  2. Place your feet flat on the floor, shoulder-width apart.  3. Slowly turn your head to the right. Turn it all the way to the right so you can look over your right shoulder. Do not tilt or tip your head.  4. Hold this position for 10-30 seconds.  5. Slowly turn your head to the left, to look over your left shoulder.  6. Hold this position for 10-30 seconds.  Neck Retraction   Repeat this exercise 8-10 times. Do this 3-4 times a day or as told by your health care provider.  1. Do this exercise while standing or while sitting in a sturdy chair.  2. Look straight ahead. Do not bend your neck.  3. Use your fingers to push your chin backward. Do not bend your neck  for this movement. Continue to face straight ahead. If you are doing the exercise properly, you will feel a slight sensation in your throat and a stretch at the back of your neck.  4. Hold the stretch for 1-2 seconds. Relax and repeat.  This information is not intended to replace advice given to you by your health care provider. Make sure you discuss any questions you have with your health care provider.  Document Released: 11/28/2016 Document Revised: 05/25/2017 Document Reviewed: 06/27/2016  [employer] Interactive Patient Education © 2017 [employer]

## 2019-11-18 NOTE — PROGRESS NOTES
cc: Diabetes follow-up, intestinal polyps, breast cancer screening, pneumonia vaccine    Subjective:     Luna Carpio is a 64 y.o. female presenting her hip pain is doing well with her yoga and her stomach pain issues are resolved.  She takes ibuprofen 600 mg maybe twice a month when her hip pain.  For the Metformin 500 mg she is doing it only once a day because sometimes she feels dizzy.  And atorvastatin she only does this 3 times a week in the morning 20 mg when she remembers.  She has been working on her diet and weight and has eliminated her sugars.  She was last seen in July.  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  Her hips are doing really well with the stretching and she is not needing that much pain medication.  Her last A1c was 6.5.    Review of systems:     Constitutional: Negative for fever, chills and negative fatigue.   HENT: Negative for sinus pressure  Eyes: Negative for blurriness  Respiratory: Negative for cough and shortness of breath, negative for exertional shortness of breath  Cardiovascular: Negative for leg swelling, negative for palpitations, negative for chest pain  Gastrointestinal: Negative for nausea, vomiting, abdominal pain, constipation and diarrhea.  Genitourinary: Negative for dysuria and hematuria.   Neurological: Negative for dizziness, focal weakness and headaches.   Endo/Heme/Allergies: Denies bleeding, bruising, and recurrent allergies.  Psychiatric/Behavioral: Negative for depression and anxiety.      Current Outpatient Medications:   •  betamethasone dipropionate (DIPROLENE) 0.05 % Ointment, Apply thin layer bilaterally as needed to affected area. 30 g tube, Disp: 1 Tube, Rfl: 1  •  atorvastatin (LIPITOR) 20 MG Tab, Take 1 Tab by mouth every evening., Disp: 90 Tab, Rfl: 1  •  metFORMIN (GLUCOPHAGE) 500 MG Tab, Take 1  "Tab by mouth 2 times a day, with meals., Disp: 180 Tab, Rfl: 0  •  ibuprofen (MOTRIN) 600 MG Tab, Take 1 Tab by mouth every 8 hours as needed., Disp: 60 Tab, Rfl: 1    Allergies, past medical history, past surgical history, family history, social history reviewed and updated    Objective:     Vitals: /78 (BP Location: Left arm, Patient Position: Sitting, BP Cuff Size: Adult)   Pulse 75   Temp 36.5 °C (97.7 °F) (Temporal)   Resp 14   Ht 1.753 m (5' 9\")   Wt 70.3 kg (155 lb)   SpO2 95%   BMI 22.89 kg/m²   General: Alert, pleasant, NAD  HEENT: Normocephalic.  Nontraumatic. EOMI, no icterus or pallor.  Conjunctivae and lids normal. External ears larissa  Heart: Regular rate and rhythm.  S1 and S2 normal.  No murmurs appreciated.  Respiratory: Normal respiratory effort.  Clear to auscultation bilaterally.  Skin: Warm, dry, no rashes.  Musculoskeletal: Gait is normal.  Moves all extremities well.  Extremities: No leg edema.  Pedal pulses 2+ symmetric.   Psych:  Affect/mood is normal, judgement is good, memory is intact, grooming is appropriate.  Monofilament testing with a 10 gram force: sensation intact: intact bilaterally  Visual Inspection: Feet without maceration, ulcers, fissures.  Pedal pulses: intact bilaterally    Assessment/Plan:     Diagnoses and all orders for this visit:    Encounter for screening mammogram for malignant neoplasm of breast  -     MA-SCREENING MAMMO BILAT W/TOMOSYNTHESIS W/CAD; Future    Polyp, intestinal    Need for vaccination  -     Pneumococal Polysaccharide Vaccine 23-Valent =>1YO SQ/IM    Type 2 diabetes mellitus without complication, without long-term current use of insulin (HCC)  -     Diabetic Monofilament Lower Extremity Exam  -     MICROALBUMIN CREAT RATIO URINE (LAB COLLECT); Future  -     Comp Metabolic Panel; Future  -     HEMOGLOBIN A1C; Future    Dizzy    Dyslipidemia  -     Lipid Profile; Future    Medication monitoring encounter  -     CBC WITH DIFFERENTIAL; " Future  -     Comp Metabolic Panel; Future  -     HEMOGLOBIN A1C; Future    Flexural eczema  -     betamethasone dipropionate (DIPROLENE) 0.05 % Ointment; Apply thin layer bilaterally as needed to affected area. 30 g tube    At least 1 week before you see me please get fasting lab work 8 hours of no eating but drink plenty of water.  Her monofilament testing was within normal limits.  She will call and get her breast cancer screening done.  We will give her the pneumonia vaccine today.  She does appear to be having some hypoglycemic events but she wants to wait to do the lab work and then we may be able to reduce her get her off metformin as her last A1c was 6.5 and she is been working on her diet and exercise.  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  She is not needing the ibuprofen that often.  For dry skin and eczema please try for prevention CeraVe cream with salicylic acid over-the-counter or online the cream with salicylic acid twice daily and for flareups you could use the betamethasone steroid ointment but because of the steroid he could think the skin so do not use for more than 7 days at a time for flareups.  And please use mild detergents and mild soaps and sunscreen.  Otherwise we will do her lab work and we will wait to see what that shows before we make any adjustments for her diabetes.  Otherwise her hips are doing better and no need for any steroid injections.  Her colonoscopy was already done and there was a polyp and so she is going to be going back every 3 years.  And she is not due for her Pap smear for another year as this is every 3 years and she had a done 2 years ago.    Return in about 25 days (around 12/13/2019), or FU labs/diabetes.

## 2019-12-02 ENCOUNTER — HOSPITAL ENCOUNTER (EMERGENCY)
Facility: MEDICAL CENTER | Age: 64
End: 2019-12-02
Attending: EMERGENCY MEDICINE
Payer: COMMERCIAL

## 2019-12-02 VITALS
BODY MASS INDEX: 23.28 KG/M2 | RESPIRATION RATE: 16 BRPM | TEMPERATURE: 97.2 F | DIASTOLIC BLOOD PRESSURE: 63 MMHG | OXYGEN SATURATION: 99 % | SYSTOLIC BLOOD PRESSURE: 120 MMHG | WEIGHT: 157.63 LBS | HEART RATE: 76 BPM

## 2019-12-02 DIAGNOSIS — H00.039 CELLULITIS OF EYELID, UNSPECIFIED LATERALITY: ICD-10-CM

## 2019-12-02 PROCEDURE — 99284 EMERGENCY DEPT VISIT MOD MDM: CPT

## 2019-12-02 RX ORDER — CEPHALEXIN 500 MG/1
500 CAPSULE ORAL 4 TIMES DAILY
Qty: 20 CAP | Refills: 0 | Status: SHIPPED | OUTPATIENT
Start: 2019-12-02 | End: 2019-12-07

## 2019-12-02 RX ORDER — POLYMYXIN B SULFATE AND TRIMETHOPRIM 1; 10000 MG/ML; [USP'U]/ML
1 SOLUTION OPHTHALMIC EVERY 4 HOURS
Qty: 10 ML | Refills: 1 | Status: SHIPPED | OUTPATIENT
Start: 2019-12-02 | End: 2019-12-07

## 2019-12-02 RX ORDER — SULFAMETHOXAZOLE AND TRIMETHOPRIM 800; 160 MG/1; MG/1
1 TABLET ORAL 2 TIMES DAILY
Qty: 10 TAB | Refills: 0 | Status: SHIPPED | OUTPATIENT
Start: 2019-12-02 | End: 2019-12-07

## 2019-12-02 NOTE — ED TRIAGE NOTES
Chief Complaint   Patient presents with   • Eye Pain     bilateral eyelid swelling and pain. No vision changes     Ambulatory to triage for above. Explained triage process, to waiting room. Asked to inform RN if questions or concerns arise.

## 2019-12-02 NOTE — ED NOTES
"Redness and eyelid swelling began \"last Monday\" in left eye and has now traveled to right eye. Pressure pain 8/10  "

## 2019-12-02 NOTE — ED PROVIDER NOTES
ED Provider Note    CHIEF COMPLAINT  Chief Complaint   Patient presents with   • Eye Pain     bilateral eyelid swelling and pain. No vision changes       HPI  Luna Carpio is a 64 y.o. female who presents erythematous change and pain to both upper and lower eyelids since the past 3 days.  There is been discharge from the right eye.  No eye pain itself, no pain with motion of the eyes, no visual change or disturbance.  Patient was concerned new make-up placed on both eyelids may have caused the problem although she did notice a pustule and now has drainage from the right eye.  Patient states she is otherwise well    REVIEW OF SYSTEMS  Constitutional: No fever  Ear nose throat: No rhinorrhea or sore throat  Eyes bilateral eyelid swelling, drainage from the right eye, no vision change       PAST MEDICAL HISTORY  Past Medical History:   Diagnosis Date   • Allergy    • Dyslipidemia 3/25/2019   • Type 2 diabetes mellitus without complication, without long-term current use of insulin ([facility]) 7/1/2019       FAMILY HISTORY  Family History   Problem Relation Age of Onset   • Diabetes Mother    • Hypertension Father        SOCIAL HISTORY  Social History     Socioeconomic History   • Marital status:      Spouse name: Not on file   • Number of children: Not on file   • Years of education: Not on file   • Highest education level: Not on file   Occupational History   • Not on file   Social Needs   • Financial resource strain: Not on file   • Food insecurity:     Worry: Not on file     Inability: Not on file   • Transportation needs:     Medical: Not on file     Non-medical: Not on file   Tobacco Use   • Smoking status: Never Smoker   • Smokeless tobacco: Never Used   Substance and Sexual Activity   • Alcohol use: Yes     Comment: Occassionally   • Drug use: No   • Sexual activity: Yes     Partners: Male     Comment: Lives with partner male. Full time. [employer]. No social or domestic concerns.   Lifestyle   • Physical  activity:     Days per week: Not on file     Minutes per session: Not on file   • Stress: Not on file   Relationships   • Social connections:     Talks on phone: Not on file     Gets together: Not on file     Attends Hinduism service: Not on file     Active member of club or organization: Not on file     Attends meetings of clubs or organizations: Not on file     Relationship status: Not on file   • Intimate partner violence:     Fear of current or ex partner: Not on file     Emotionally abused: Not on file     Physically abused: Not on file     Forced sexual activity: Not on file   Other Topics Concern   • Not on file   Social History Narrative   • Not on file       SURGICAL HISTORY  Past Surgical History:   Procedure Laterality Date   • PRIMARY C SECTION         CURRENT MEDICATIONS  Home Medications     Reviewed by Bobby Morillo R.N. (Registered Nurse) on 12/02/19 at 1246  Med List Status: Complete   Medication Last Dose Status   ibuprofen (MOTRIN) 600 MG Tab  Active   metFORMIN (GLUCOPHAGE) 500 MG Tab  Active                ALLERGIES  No Known Allergies    PHYSICAL EXAM  VITAL SIGNS: /63   Pulse 76   Temp 36.2 °C (97.2 °F) (Temporal)   Resp 16   Wt 71.5 kg (157 lb 10.1 oz)   SpO2 99%   BMI 23.28 kg/m²   Constitutional: Non-toxic appearance.   ENT: Oropharynx moist, nares clear, Nose normal.   Eyes: Slight conjunctivitis right lower eyelid region, otherwise sclera unchanged.  Pupils are equal and reactive, extraocular motions normal without pain.  Bilateral upper and lower eyelids are swollen and red.  Cardiovascular: Regular rate and rhythm, No murmurs.   Pulmonary: Normal breath sounds.      COURSE & MEDICAL DECISION MAKING  Pertinent Labs & Imaging studies reviewed. (See chart for details)  Differential diagnosis includes contact dermatitis from the make-up she applied last week, also blepharitis with local cellulitis.  Patient is placed on eyedrops for the discharge from the right eye, Keflex  and Bactrim for coverage of cellulitis.  She advised see her doctor for recheck later this week no improvement, return sooner if worse or for any concerns.  There is no evidence of orbital cellulitis, she has no pain with range of motion of her eyes.    FINAL IMPRESSION  1. Cellulitis of eyelid, unspecified laterality           Electronically signed by: Robbin Andrews, 12/2/2019 2:17 PM

## 2019-12-06 ENCOUNTER — HOSPITAL ENCOUNTER (OUTPATIENT)
Dept: LAB | Facility: MEDICAL CENTER | Age: 64
End: 2019-12-06
Attending: FAMILY MEDICINE
Payer: COMMERCIAL

## 2019-12-06 DIAGNOSIS — E78.5 DYSLIPIDEMIA: ICD-10-CM

## 2019-12-06 DIAGNOSIS — E11.9 TYPE 2 DIABETES MELLITUS WITHOUT COMPLICATION, WITHOUT LONG-TERM CURRENT USE OF INSULIN (HCC): ICD-10-CM

## 2019-12-06 DIAGNOSIS — Z51.81 MEDICATION MONITORING ENCOUNTER: ICD-10-CM

## 2019-12-06 LAB
ALBUMIN SERPL BCP-MCNC: 4.4 G/DL (ref 3.2–4.9)
ALBUMIN/GLOB SERPL: 1.2 G/DL
ALP SERPL-CCNC: 67 U/L (ref 30–99)
ALT SERPL-CCNC: 14 U/L (ref 2–50)
ANION GAP SERPL CALC-SCNC: 8 MMOL/L (ref 0–11.9)
AST SERPL-CCNC: 22 U/L (ref 12–45)
BASOPHILS # BLD AUTO: 0.6 % (ref 0–1.8)
BASOPHILS # BLD: 0.04 K/UL (ref 0–0.12)
BILIRUB SERPL-MCNC: 0.9 MG/DL (ref 0.1–1.5)
BUN SERPL-MCNC: 18 MG/DL (ref 8–22)
CALCIUM SERPL-MCNC: 9.6 MG/DL (ref 8.5–10.5)
CHLORIDE SERPL-SCNC: 104 MMOL/L (ref 96–112)
CHOLEST SERPL-MCNC: 177 MG/DL (ref 100–199)
CO2 SERPL-SCNC: 26 MMOL/L (ref 20–33)
CREAT SERPL-MCNC: 1.12 MG/DL (ref 0.5–1.4)
CREAT UR-MCNC: 77.6 MG/DL
EOSINOPHIL # BLD AUTO: 0.11 K/UL (ref 0–0.51)
EOSINOPHIL NFR BLD: 1.8 % (ref 0–6.9)
ERYTHROCYTE [DISTWIDTH] IN BLOOD BY AUTOMATED COUNT: 47.9 FL (ref 35.9–50)
EST. AVERAGE GLUCOSE BLD GHB EST-MCNC: 140 MG/DL
FASTING STATUS PATIENT QL REPORTED: NORMAL
GLOBULIN SER CALC-MCNC: 3.6 G/DL (ref 1.9–3.5)
GLUCOSE SERPL-MCNC: 104 MG/DL (ref 65–99)
HBA1C MFR BLD: 6.5 % (ref 0–5.6)
HCT VFR BLD AUTO: 38.3 % (ref 37–47)
HDLC SERPL-MCNC: 64 MG/DL
HGB BLD-MCNC: 12.6 G/DL (ref 12–16)
IMM GRANULOCYTES # BLD AUTO: 0.01 K/UL (ref 0–0.11)
IMM GRANULOCYTES NFR BLD AUTO: 0.2 % (ref 0–0.9)
LDLC SERPL CALC-MCNC: 100 MG/DL
LYMPHOCYTES # BLD AUTO: 2.16 K/UL (ref 1–4.8)
LYMPHOCYTES NFR BLD: 34.6 % (ref 22–41)
MCH RBC QN AUTO: 30.5 PG (ref 27–33)
MCHC RBC AUTO-ENTMCNC: 32.9 G/DL (ref 33.6–35)
MCV RBC AUTO: 92.7 FL (ref 81.4–97.8)
MICROALBUMIN UR-MCNC: <0.7 MG/DL
MICROALBUMIN/CREAT UR: NORMAL MG/G (ref 0–30)
MONOCYTES # BLD AUTO: 0.49 K/UL (ref 0–0.85)
MONOCYTES NFR BLD AUTO: 7.9 % (ref 0–13.4)
NEUTROPHILS # BLD AUTO: 3.43 K/UL (ref 2–7.15)
NEUTROPHILS NFR BLD: 54.9 % (ref 44–72)
NRBC # BLD AUTO: 0 K/UL
NRBC BLD-RTO: 0 /100 WBC
PLATELET # BLD AUTO: 354 K/UL (ref 164–446)
PMV BLD AUTO: 9.8 FL (ref 9–12.9)
POTASSIUM SERPL-SCNC: 4.6 MMOL/L (ref 3.6–5.5)
PROT SERPL-MCNC: 8 G/DL (ref 6–8.2)
RBC # BLD AUTO: 4.13 M/UL (ref 4.2–5.4)
SODIUM SERPL-SCNC: 138 MMOL/L (ref 135–145)
TRIGL SERPL-MCNC: 63 MG/DL (ref 0–149)
WBC # BLD AUTO: 6.2 K/UL (ref 4.8–10.8)

## 2019-12-06 PROCEDURE — 82043 UR ALBUMIN QUANTITATIVE: CPT

## 2019-12-06 PROCEDURE — 36415 COLL VENOUS BLD VENIPUNCTURE: CPT

## 2019-12-06 PROCEDURE — 85025 COMPLETE CBC W/AUTO DIFF WBC: CPT

## 2019-12-06 PROCEDURE — 80053 COMPREHEN METABOLIC PANEL: CPT

## 2019-12-06 PROCEDURE — 83036 HEMOGLOBIN GLYCOSYLATED A1C: CPT

## 2019-12-06 PROCEDURE — 82570 ASSAY OF URINE CREATININE: CPT

## 2019-12-06 PROCEDURE — 80061 LIPID PANEL: CPT

## 2019-12-13 ENCOUNTER — OFFICE VISIT (OUTPATIENT)
Dept: MEDICAL GROUP | Facility: PHYSICIAN GROUP | Age: 64
End: 2019-12-13
Payer: COMMERCIAL

## 2019-12-13 VITALS
SYSTOLIC BLOOD PRESSURE: 122 MMHG | TEMPERATURE: 97.4 F | OXYGEN SATURATION: 97 % | HEART RATE: 79 BPM | RESPIRATION RATE: 14 BRPM | HEIGHT: 69 IN | DIASTOLIC BLOOD PRESSURE: 78 MMHG | WEIGHT: 157.4 LBS | BODY MASS INDEX: 23.31 KG/M2

## 2019-12-13 DIAGNOSIS — N28.9 RENAL INSUFFICIENCY: ICD-10-CM

## 2019-12-13 DIAGNOSIS — E55.9 VITAMIN D DEFICIENCY: ICD-10-CM

## 2019-12-13 DIAGNOSIS — Z51.81 MEDICATION MONITORING ENCOUNTER: ICD-10-CM

## 2019-12-13 DIAGNOSIS — L20.82 FLEXURAL ECZEMA: ICD-10-CM

## 2019-12-13 DIAGNOSIS — E11.9 TYPE 2 DIABETES MELLITUS WITHOUT COMPLICATION, WITHOUT LONG-TERM CURRENT USE OF INSULIN (HCC): ICD-10-CM

## 2019-12-13 DIAGNOSIS — E78.5 DYSLIPIDEMIA: ICD-10-CM

## 2019-12-13 DIAGNOSIS — J30.2 SEASONAL ALLERGIC RHINITIS, UNSPECIFIED TRIGGER: ICD-10-CM

## 2019-12-13 PROCEDURE — 99214 OFFICE O/P EST MOD 30 MIN: CPT | Performed by: FAMILY MEDICINE

## 2019-12-13 RX ORDER — SULFAMETHOXAZOLE AND TRIMETHOPRIM 800; 160 MG/1; MG/1
TABLET ORAL
Refills: 0 | COMMUNITY
Start: 2019-12-02 | End: 2019-12-15

## 2019-12-15 ENCOUNTER — TELEPHONE (OUTPATIENT)
Dept: MEDICAL GROUP | Facility: PHYSICIAN GROUP | Age: 64
End: 2019-12-15

## 2019-12-15 PROBLEM — N28.9 RENAL INSUFFICIENCY: Status: ACTIVE | Noted: 2019-12-15

## 2019-12-15 PROBLEM — R42 DIZZY: Status: RESOLVED | Noted: 2019-11-18 | Resolved: 2019-12-15

## 2019-12-15 RX ORDER — MONTELUKAST SODIUM 10 MG/1
10 TABLET ORAL DAILY
Qty: 90 TAB | Refills: 3 | Status: SHIPPED | OUTPATIENT
Start: 2019-12-15 | End: 2020-06-30

## 2019-12-15 RX ORDER — FLUTICASONE PROPIONATE 50 MCG
1 SPRAY, SUSPENSION (ML) NASAL DAILY
Qty: 16 G | Refills: 2 | Status: SHIPPED | OUTPATIENT
Start: 2019-12-15 | End: 2021-04-23

## 2019-12-15 RX ORDER — ATORVASTATIN CALCIUM 20 MG/1
20 TABLET, FILM COATED ORAL EVERY EVENING
Qty: 90 TAB | Refills: 1 | Status: SHIPPED | OUTPATIENT
Start: 2019-12-15 | End: 2020-06-30

## 2019-12-16 NOTE — PATIENT INSTRUCTIONS
Diagnoses and all orders for this visit:    Flexural eczema    Dyslipidemia  -     atorvastatin (LIPITOR) 20 MG Tab; Take 1 Tab by mouth every evening.    Medication monitoring encounter  -     Basic Metabolic Panel; Future    Seasonal allergic rhinitis, unspecified trigger  -     montelukast (SINGULAIR) 10 MG Tab; Take 1 Tab by mouth every day.  -     fluticasone (FLONASE) 50 MCG/ACT nasal spray; Spray 1 Spray in nose every day.    Type 2 diabetes mellitus without complication, without long-term current use of insulin (HCC)  -     Basic Metabolic Panel; Future  -     metFORMIN (GLUCOPHAGE) 500 MG Tab; Take 1 Tab by mouth 2 times a day, with meals.    Vitamin D deficiency    Renal insufficiency  -     Basic Metabolic Panel; Future    -computers were down today but we did have a copy of her lab results and we went over this in person today December 6 she did lab work and her CBC was within normal limits, CMP within normal limits but fasting glucose mildly high at 104, A1c is 6.5 which was same as before since she has been off her metformin, her lipids improved with  and better than her LDL before which was 124, urine creatinine increased so urine microalbumin unable to calculate and GFR decreased to 49 from greater than 60 which was within normal limits.   -As her kidney function decreased please stay hydrated drink plenty of water and avoid any medications that could damage the kidney such as ibuprofen and we will check this lab work 1 week before you see me in 10 weeks and this is only lab work we will check before your next visit in February otherwise we will recheck her A1c later in 3 to 6 months and her urine microalbumin we will repeat them as well to.  As she has been off of her diabetic and cholesterol medications her diabetes has remained the same with A1c of 6.5 but her LDL did improve but we would still like it to be less than 70 and so we will restart her metformin 500 mg to take twice a day with  food and atorvastatin 20 mg low-dose to take before bedtime for cholesterol and for her allergies I will refill and she can take as needed Flonase 1 to 2 sprays in each nostril daily and montelukast 10 mg pill for her allergies as needed daily for her allergies and could help with her current swelling from her reaction to hemp oil.  Also continue vitamin D 1000 units over-the-counter daily otherwise please read patient instruction section after visit section summary of how to prevent chronic kidney disease such as decreasing your salt and increasing hydration and avoiding medications like ibuprofen that could damage her kidneys and we will recheck and repeat this lab work as this is the first time her kidney function has been decreased and we will see her back in about 10 weeks to see how she is doing and to recheck this lab work.    Return in about 10 weeks (around 2/21/2020), or FU kidneys/lab/diabetes/BP.      Preventing Chronic Kidney Disease  Chronic kidney disease (CKD) occurs when the kidneys are damaged for at least 3 months and do not function effectively. The kidneys are two organs that do many important jobs in the body, including:  · Removing waste and extra fluids from the blood.  · Regulating hormones, blood pressure, and blood chemistry.  At first, you may not notice any signs or symptoms of CKD. However, CKD gets worse over time (is progressive). You can prevent CKD or keep CKD from progressing by making certain changes to your lifestyle and nutrition.  Risk factors for CKD include:  · Being age 60 or older.  · Being female.  · Having any of the following:  ¨ Diabetes.  ¨ High blood pressure.  ¨ Heart disease.  ¨ An autoimmune disease.  ¨ Frequent urinary tract infections.  ¨ Polycystic kidney disease.  ¨ A family history of kidney disease, heart disease, diabetes, or high blood pressure.  · Having problems with urine flow that may be caused by:  ¨ Cancer.  ¨ Having kidney stones more than  once.  ¨ An enlarged prostate in males.  · Being of -American, , , , or  descent.  · Being obese.  · Long-term use of NSAIDs.  · Current or former tobacco use.  What types of nutrition changes can I make?  A balanced meal plan can help keep your kidneys healthy and prevent CKD. A balanced meal plan may involve:  · Limiting salt (sodium) intake. You should have less than 1 tsp (2,300 mg) of sodium per day. If you have heart disease or high blood pressure, you should have less than ¾ tsp (1,500 mg) of sodium per day.  · Limiting alcohol intake to no more than 1 drink a day for nonpregnant women and 2 drinks a day for men. One drink equals 12 oz of beer, 5 oz of wine, or 1½ oz of hard liquor.  If you have diabetes, work with a nutrition specialist (registered dietitian) or a certified diabetes educator to develop a healthy eating plan.  What types of lifestyle changes can I make?  Lifestyle changes that can help prevent CKD include:  · Talking with your health care provider about how much fluid you should drink each day.  · Working with your health care provider to manage your blood pressure. This includes healthy eating, regular exercise, and taking any medicines that are prescribed for you.  · Exercising for at least 30 minutes on five or more days of the week, or as much as told by your health care provider.  · Keeping your weight at a healthy level. If you are overweight or obese, lose weight as told by your health care provider.  · Not smoking or using any tobacco products, such as cigarettes, chewing tobacco, and e-cigarettes. If you need help quitting, ask your health care provider.  · Having a yearly physical exam.  · Learning about your family's medical history. Talk to your relatives and siblings about diabetes, heart disease, and high blood pressure.  · Using NSAIDs for pain only when necessary. Ask your health care provider about other pain medicines that  do not increase your risk of developing CKD.  If you have diabetes, managing your diabetes with a healthy meal plan and physical activity can help prevent CKD. Work with your health care provider to manage your condition.  What can happen if changes are not made?  If you do not make lifestyle and nutrition changes to protect your kidneys, you may develop CKD, which can lead to:  · A low red blood cell count (anemia).  · Heart disease.  · Weak bones.  · Nerve damage (neuropathy).  · Stroke.  · Kidney failure and dialysis.  What can I do to lower my risk?  Talk to your health care provider about your kidney health and your risk factors for CKD. The most important steps to take to lower your risk of CKD are:  · Getting high blood pressure down to the target that your health care provider recommends.  · Getting blood sugar (glucose) levels down to the target that your health care provider recommends.  · Eating less sodium.  What are my treatment options for CKD?     Treatment for CKD may include:  · Medicines that:  ¨ Control high blood pressure, such as ACE inhibitors.  ¨ Control blood glucose levels.  ¨ Control cholesterol levels.  ¨ Help your body get rid of excess water (diuretics).  ¨ Help protect your bones.  · Lifestyle changes. You may need to:  ¨ Eat less salt.  ¨ Eat less protein.  ¨ Follow a heart-healthy meal plan.  ¨ Quit smoking.  ¨ Avoid phosphorus. Phosphorous is found in dark-colored sodas and canned ice teas.  ¨ Avoid potassium. Potassium is found in some juices, especially orange juice.  ¨ Avoid alcohol.  · Dialysis. This is when a machine filters your blood if your kidney fails.  · Kidney transplant, if your kidney fails.  Where can I get more information?  Learn more about CKD and how to prevent CKD from:  · The National Kidney Foundation: www.kidney.org  · The American Association of Kidney Patients: www.aakp.org  · The American Diabetes Association: www.diabetes.org  Summary  You may not notice  symptoms of CKD until your kidneys have already been damaged. The best way to prevent kidney damage is to know your risk factors and make nutrition and lifestyle changes before you develop symptoms of CKD.  This information is not intended to replace advice given to you by your health care provider. Make sure you discuss any questions you have with your health care provider.  Document Released: 01/13/2017 Document Revised: 08/30/2017 Document Reviewed: 11/14/2016  [employer] Interactive Patient Education © 2017 [employer]  Food Basics for Chronic Kidney Disease  When your kidneys are not working well, they cannot remove waste and excess substances from your blood as effectively as they did before. This can lead to a buildup and imbalance of these substances, which can affect how your body functions. This buildup can also make your kidneys work harder, causing even more damage.  You may need to eat less of certain foods that can lead to the buildup of these substances in your body. By making the changes to your diet that are recommended by your dietitian or health care provider, you could possibly help prevent further kidney damage and delay or prevent the need for dialysis.  The following information can help give you a basic understanding of these substances and how they affect your bodily functions. The information also gives examples of foods that contain the highest amounts of these substances.  WHAT DO I NEED TO KNOW ABOUT SUBSTANCES IN MY FOOD THAT I MAY NEED TO ADJUST?  Food adjustments will be different for each person with chronic kidney disease. It is important that you see a dietitian who can help you determine the specific adjustments that you will need to make for each of the following substances:  Potassium   Potassium affects how steadily your heart beats. If too much potassium builds up in your blood, it can cause an irregular heartbeat or even a heart attack.  Examples of foods rich in potassium  include:  · Milk.  · Fruits.  · Vegetables.  Phosphorus   Phosphorus is a mineral found in your bones. A balance between calcium and phosphorous is needed to build and maintain healthy bones. Too much phosphorus pulls calcium from your bones. This can make your bones weak and more likely to break. Too much phosphorus can also make your skin itch.  Examples of foods rich in phosphorus include:  · Milk and cheese.  · Dried beans.  · Peas.  · Meliton.  · Nuts and peanut butter.  Animal Protein   Animal protein helps you make and keep muscle. It also helps in the repair of your body's cells and tissues. One of the natural breakdown products of protein is a waste product called urea. When your kidneys are not working properly, they cannot remove wastes such as urea like they did before you developed chronic kidney disease. You will likely need to limit the amount of protein you eat to help prevent a buildup of urea in your blood.  Examples of animal protein include:  · Meat (all types).  · Fish and seafood.  · Poultry.  · Eggs.  Sodium   Sodium, which is found in salt, helps maintain a healthy balance of fluids in your body. Too much sodium can increase your blood pressure level and have a negative affect on the function of your heart and lungs. Too much sodium also can cause your body to retain too much fluid, making your kidneys work harder. Examples of foods with high levels of sodium include:  · Salt seasonings.  · Soy sauce.  · Cured and processed meats.  · Salted crackers and snack foods.  · Fast food.  · Canned soups and most canned foods.  Glucose   Glucose provides energy for your body. If you have diabetes mellitus that is not properly controlled, you have too much glucose in your blood. Too much glucose in your blood can worsen the function of your kidneys by damaging small blood vessels. This prevents enough blood flow to your kidneys to give them what they need to work. If you have diabetes mellitus and  chronic kidney disease, it is important to maintain your blood glucose at a level recommended by your health care provider.  SHOULD I TAKE A VITAMIN AND MINERAL SUPPLEMENT?  Because you may need to avoid eating certain foods, you may not get all of the vitamins and minerals that would normally come from those foods. Your health care provider or dietitian may recommend that you take a supplement to ensure that you get all of the vitamins and minerals that your body needs.   This information is not intended to replace advice given to you by your health care provider. Make sure you discuss any questions you have with your health care provider.  Document Released: 03/09/2004 Document Revised: 01/08/2016 Document Reviewed: 11/14/2014  [employer] Interactive Patient Education © 2017 [employer]

## 2019-12-16 NOTE — TELEPHONE ENCOUNTER
As the computers were down please call the patient and let the patient know that her for medications are sent to her [facility] so she could  her metformin 500 mg to take twice daily for her diabetes, atorvastatin 20 mg to take once daily before bed for cholesterol, and her allergies medication have been refilled her Flonase nasal steroid spray for her nostrils to do 1 to 2 sprays in each nostril as needed and her montelukast pill for allergy prevention is also sent to the pharmacy.  Also we could mail her her after visit summary and especially patient instruction section on how to prevent chronic kidney disease as her kidney function has decreased and her A1c stayed the same but her LDL did improve so good job with that which is your bad cholesterol but we would still like it to be a little lower.  Because her kidney function has decreased we could send you the information on preventing chronic kidney disease and please avoid any ibuprofen or Advil and stay hydrated and decrease the salt in your diet and we will recheck this lab a few days before you see me in about 10 weeks so if you would like to make a follow-up in 10 weeks to see me at the end of February and get this 1 lab done a few days before you see me and we could go over this at your follow-up but otherwise your medications have been sent to the pharmacy and we could email you your after visit summary if you like.  Have a wonderful holidays.  Thanks

## 2019-12-16 NOTE — PROGRESS NOTES
cc: Type 2 diabetes stable, renal insufficiency, dyslipidemia improved, chronic hip pain improved, allergies stable    Subjective:     Luna Carpio is a 64 y.o. female presenting computers were down today but we did have a copy of her lab results and we went over this in person today December 6 she did lab work and her CBC was within normal limits, CMP within normal limits but fasting glucose mildly high at 104, A1c is 6.5 which was same as before since she has been off her metformin, her lipids improved with  and better than her LDL before which was 124, urine creatinine increased so urine microalbumin unable to calculate and GFR decreased to 49 from greater than 60 which was within normal limits.  She reports her chronic hip pain is improved and is not bothering her anymore.  She has been off her cholesterol medication atorvastatin 20 mg and metformin 500 mg as she was trying to do her diabetes diet control but she thinks she may want to start on a low-dose again.  She was taking some ibuprofen as needed but not daily.  She did have some swelling in her eyes due to applying topical hemp oil on her eyelids and so she went to urgent care and they gave her some topical steroids and her swelling has now been doing better.  Her allergies have been stable on Flonase nasal spray montelukast and she would like refills of this.    Review of systems:     Constitutional: Negative for fever, chills and negative fatigue.   HENT: Negative for sinus pressure  Eyes: Negative for blurriness  Respiratory: Negative for cough and shortness of breath, negative for exertional shortness of breath  Cardiovascular: Negative for leg swelling, negative for palpitations, negative for chest pain  Gastrointestinal: Negative for nausea, vomiting, abdominal pain, constipation and diarrhea.  Genitourinary: Negative for dysuria and hematuria.   Neurological: Negative for dizziness, focal weakness and headaches.   Endo/Heme/Allergies:  "Denies bleeding, bruising, and recurrent allergies.  Psychiatric/Behavioral: Negative for depression and anxiety.      Current Outpatient Medications:   •  vitamin D (CHOLECALCIFEROL) 1000 UNIT Tab, Take 1,000 Units by mouth every day., Disp: , Rfl:   •  atorvastatin (LIPITOR) 20 MG Tab, Take 1 Tab by mouth every evening., Disp: 90 Tab, Rfl: 1  •  metFORMIN (GLUCOPHAGE) 500 MG Tab, Take 1 Tab by mouth 2 times a day, with meals., Disp: 180 Tab, Rfl: 1  •  montelukast (SINGULAIR) 10 MG Tab, Take 1 Tab by mouth every day., Disp: 90 Tab, Rfl: 3  •  fluticasone (FLONASE) 50 MCG/ACT nasal spray, Spray 1 Spray in nose every day., Disp: 16 g, Rfl: 2    Allergies, past medical history, past surgical history, family history, social history reviewed and updated    Objective:     Vitals: /78   Pulse 79   Temp 36.3 °C (97.4 °F) (Temporal)   Resp 14   Ht 1.753 m (5' 9\")   Wt 71.4 kg (157 lb 6.4 oz)   SpO2 97%   BMI 23.24 kg/m²   General: Alert, pleasant, NAD  HEENT: Normocephalic.  Nontraumatic. EOMI, no icterus or pallor.  Conjunctivae and lids normal. External ears normal.   Heart: Regular rate and rhythm.  S1 and S2 normal.  No murmurs appreciated.  Respiratory: Normal respiratory effort.  Clear to auscultation bilaterally.  Skin: Warm, dry, no rashes.  Musculoskeletal: Gait is normal.  Moves all extremities well.  Extremities: No leg edema.  Psych:  Affect/mood is normal, judgement is good, memory is intact, grooming is appropriate.    Assessment/Plan:     Diagnoses and all orders for this visit:    Flexural eczema    Dyslipidemia  -     atorvastatin (LIPITOR) 20 MG Tab; Take 1 Tab by mouth every evening.    Medication monitoring encounter  -     Basic Metabolic Panel; Future    Seasonal allergic rhinitis, unspecified trigger  -     montelukast (SINGULAIR) 10 MG Tab; Take 1 Tab by mouth every day.  -     fluticasone (FLONASE) 50 MCG/ACT nasal spray; Spray 1 Spray in nose every day.    Type 2 diabetes mellitus " without complication, without long-term current use of insulin (HCC)  -     Basic Metabolic Panel; Future  -     metFORMIN (GLUCOPHAGE) 500 MG Tab; Take 1 Tab by mouth 2 times a day, with meals.    Vitamin D deficiency    Renal insufficiency  -     Basic Metabolic Panel; Future    -computers were down today but we did have a copy of her lab results and we went over this in person today December 6 she did lab work and her CBC was within normal limits, CMP within normal limits but fasting glucose mildly high at 104, A1c is 6.5 which was same as before since she has been off her metformin, her lipids improved with  and better than her LDL before which was 124, urine creatinine increased so urine microalbumin unable to calculate and GFR decreased to 49 from greater than 60 which was within normal limits.   -As her kidney function decreased please stay hydrated drink plenty of water and avoid any medications that could damage the kidney such as ibuprofen and we will check this lab work 1 week before you see me in 10 weeks and this is only lab work we will check before your next visit in February otherwise we will recheck her A1c later in 3 to 6 months and her urine microalbumin we will repeat them as well to.  As she has been off of her diabetic and cholesterol medications her diabetes has remained the same with A1c of 6.5 but her LDL did improve but we would still like it to be less than 70 and so we will restart her metformin 500 mg to take twice a day with food and atorvastatin 20 mg low-dose to take before bedtime for cholesterol and for her allergies I will refill and she can take as needed Flonase 1 to 2 sprays in each nostril daily and montelukast 10 mg pill for her allergies as needed daily for her allergies and could help with her current swelling from her reaction to hemp oil.  Also continue vitamin D 1000 units over-the-counter daily otherwise please read patient instruction section after visit section  summary of how to prevent chronic kidney disease such as decreasing your salt and increasing hydration and avoiding medications like ibuprofen that could damage her kidneys and we will recheck and repeat this lab work as this is the first time her kidney function has been decreased and we will see her back in about 10 weeks to see how she is doing and to recheck this lab work.    Return in about 10 weeks (around 2/21/2020), or FU kidneys/lab/diabetes/BP.

## 2020-06-30 ENCOUNTER — TELEPHONE (OUTPATIENT)
Dept: HEALTH INFORMATION MANAGEMENT | Facility: OTHER | Age: 65
End: 2020-06-30

## 2020-06-30 ENCOUNTER — TELEMEDICINE (OUTPATIENT)
Dept: MEDICAL GROUP | Facility: MEDICAL CENTER | Age: 65
End: 2020-06-30
Payer: COMMERCIAL

## 2020-06-30 VITALS — BODY MASS INDEX: 22.51 KG/M2 | TEMPERATURE: 99 F | HEIGHT: 69 IN | WEIGHT: 152 LBS

## 2020-06-30 DIAGNOSIS — E78.5 DYSLIPIDEMIA: ICD-10-CM

## 2020-06-30 DIAGNOSIS — R52 BODY ACHES: ICD-10-CM

## 2020-06-30 DIAGNOSIS — Z76.89 ENCOUNTER TO ESTABLISH CARE: ICD-10-CM

## 2020-06-30 DIAGNOSIS — K63.5: ICD-10-CM

## 2020-06-30 DIAGNOSIS — R05.9 COUGH: ICD-10-CM

## 2020-06-30 DIAGNOSIS — E11.9 TYPE 2 DIABETES MELLITUS WITHOUT COMPLICATION, WITHOUT LONG-TERM CURRENT USE OF INSULIN (HCC): ICD-10-CM

## 2020-06-30 DIAGNOSIS — Z51.81 ENCOUNTER FOR THERAPEUTIC DRUG MONITORING: ICD-10-CM

## 2020-06-30 DIAGNOSIS — N28.9 RENAL INSUFFICIENCY: ICD-10-CM

## 2020-06-30 PROCEDURE — 99214 OFFICE O/P EST MOD 30 MIN: CPT | Mod: 95,CR | Performed by: FAMILY MEDICINE

## 2020-06-30 ASSESSMENT — FIBROSIS 4 INDEX: FIB4 SCORE: 1.05

## 2020-06-30 NOTE — ASSESSMENT & PLAN NOTE
This is a chronic problem for this patient.  Previously she was on atorvastatin.  She was that she started having headaches with atorvastatin and she stopped taking this medication.

## 2020-06-30 NOTE — ASSESSMENT & PLAN NOTE
This is a new problem for this patient.  Patient reports his symptoms started on Friday, 6/26/2020.  She is also having body aches, temperature of 99, diarrhea, headaches.  She reports that she works in [employer] at  and deal with multiple customers in a day she reports that she was tested 3 weeks ago by [employer] before starting work and she was negative for COVID.  She is taking Motrin as needed for symptoms.

## 2020-06-30 NOTE — ASSESSMENT & PLAN NOTE
This is a chronic problem for this patient.  Her last GFR came back 49 and creatinine 1.12.  At that time she was on metformin, she stopped taking metformin because her kidney function was worsening.

## 2020-06-30 NOTE — ASSESSMENT & PLAN NOTE
This is a chronic problem for this patient.  She reports that she was taking metformin previously.  She reports that metformin started affecting her kidneys so she stopped taking metformin.  She reports that she had made significant dietary changes and cut down on sugars.

## 2020-06-30 NOTE — ASSESSMENT & PLAN NOTE
Patient reports that her last colonoscopy was in February 2019 with showed some polyps.  She reports that polyps were removed and was recommended to repeat colonoscopy in 3 years.

## 2020-06-30 NOTE — PROGRESS NOTES
Telemedicine Visit: New Patient     This encounter was conducted via [facility].   Verbal consent was obtained. Patient's identity was verified.    Subjective:     CC: Diagnoses of Encounter to establish care, Body aches, Cough, Type 2 diabetes mellitus without complication, without long-term current use of insulin (HCC), Medication monitoring encounter, Dyslipidemia, Renal insufficiency, and Polyp, intestinal were pertinent to this visit.    Luna Carpio is a 64 y.o. female presenting to establish care and to discuss the evaluation and management of cough, body aches and chronic medical conditions.    Type 2 diabetes mellitus without complication, without long-term current use of insulin (HCC)  This is a chronic problem for this patient.  She reports that she was taking metformin previously.  She reports that metformin started affecting her kidneys so she stopped taking metformin.  She reports that she had made significant dietary changes and cut down on sugars.      Dyslipidemia  This is a chronic problem for this patient.  Previously she was on atorvastatin.  She was that she started having headaches with atorvastatin and she stopped taking this medication.      Polyp, intestinal  Patient reports that her last colonoscopy was in February 2019 with showed some polyps.  She reports that polyps were removed and was recommended to repeat colonoscopy in 3 years.      Cough  This is a new problem for this patient.  Patient reports his symptoms started on Friday, 6/26/2020.  She is also having body aches, temperature of 99, diarrhea, headaches.  She reports that she works in [employer] at  and deal with multiple customers in a day she reports that she was tested 3 weeks ago by [employer] before starting work and she was negative for COVID.  She is taking Motrin as needed for symptoms.    Renal insufficiency  This is a chronic problem for this patient.  Her last GFR came back 49 and creatinine 1.12.  At that time she  was on metformin, she stopped taking metformin because her kidney function was worsening.      Review of systems:    See HPI  Constitutional: Positive for fever, chills, bodyaches and malaise/fatigue.   HENT: Negative for congestion.    Eyes: Negative for pain.   Respiratory: Negative for cough and shortness of breath.    Cardiovascular: Negative for leg swelling.   Gastrointestinal: Negative for nausea, vomiting, abdominal pain.  Positive for diarrhea.   Genitourinary: Negative for dysuria and hematuria.   Skin: Negative for rash.   Neurological: Negative for dizziness, focal weakness and headaches.   Endo/Heme/Allergies: Does not bruise/bleed easily.   Psychiatric/Behavioral: Negative for depression.  The patient is not nervous/anxious.      Past Medical, Surgical and Family History:    Past Medical History:   Diagnosis Date   • Allergy    • Dyslipidemia 3/25/2019   • Renal insufficiency 12/15/2019   • Type 2 diabetes mellitus without complication, without long-term current use of insulin ([facility]) 7/1/2019     Past Surgical History:   Procedure Laterality Date   • PRIMARY C SECTION       Family History   Problem Relation Age of Onset   • Diabetes Mother    • Hypertension Father         Social History:    Social History     Tobacco Use   • Smoking status: Never Smoker   • Smokeless tobacco: Never Used   Substance Use Topics   • Alcohol use: Yes     Comment: Occassionally   • Drug use: No        Medications and Allergies:     Current Outpatient Medications   Medication Sig Dispense Refill   • fluticasone (FLONASE) 50 MCG/ACT nasal spray Spray 1 Spray in nose every day. 16 g 2     No current facility-administered medications for this visit.         No Known Allergies      Labs:    Ref Range & Units  6mo ago    Cholesterol,Tot  100 - 199 mg/dL  177     Triglycerides  0 - 149 mg/dL  63     HDL  >=40 mg/dL  64     LDL  <100 mg/dL  100High      Ref Range & Units  6mo ago    Glycohemoglobin  0.0 - 5.6 %  6.5High       Ref  Range & Units  6mo ago    Sodium  135 - 145 mmol/L  138     Potassium  3.6 - 5.5 mmol/L  4.6     Chloride  96 - 112 mmol/L  104     Co2  20 - 33 mmol/L  26     Anion Gap  0.0 - 11.9  8.0     Glucose  65 - 99 mg/dL  104High       Bun  8 - 22 mg/dL  18     Creatinine  0.50 - 1.40 mg/dL  1.12     Calcium  8.5 - 10.5 mg/dL  9.6     AST(SGOT)  12 - 45 U/L  22     ALT(SGPT)  2 - 50 U/L  14     Alkaline Phosphatase  30 - 99 U/L  67     Total Bilirubin  0.1 - 1.5 mg/dL  0.9     Albumin  3.2 - 4.9 g/dL  4.4     Total Protein  6.0 - 8.2 g/dL  8.0     Globulin  1.9 - 3.5 g/dL  3.6High       A-G Ratio  g/dL  1.2      Ref Range & Units  6mo ago    WBC  4.8 - 10.8 K/uL  6.2     RBC  4.20 - 5.40 M/uL  4.13Low       Hemoglobin  12.0 - 16.0 g/dL  12.6     Hematocrit  37.0 - 47.0 %  38.3     MCV  81.4 - 97.8 fL  92.7     MCH  27.0 - 33.0 pg  30.5     MCHC  33.6 - 35.0 g/dL  32.9Low       RDW  35.9 - 50.0 fL  47.9     Platelet Count  164 - 446 K/uL  354     MPV  9.0 - 12.9 fL  9.8     Neutrophils-Polys  44.00 - 72.00 %  54.90     Lymphocytes  22.00 - 41.00 %  34.60     Monocytes  0.00 - 13.40 %  7.90     Eosinophils  0.00 - 6.90 %  1.80     Basophils  0.00 - 1.80 %  0.60     Immature Granulocytes  0.00 - 0.90 %  0.20     Nucleated RBC  /100 WBC  0.00     Neutrophils (Absolute)  2.00 - 7.15 K/uL  3.43     Comment: Includes immature neutrophils, if present.    Lymphs (Absolute)  1.00 - 4.80 K/uL  2.16     Monos (Absolute)  0.00 - 0.85 K/uL  0.49     Eos (Absolute)  0.00 - 0.51 K/uL  0.11     Baso (Absolute)  0.00 - 0.12 K/uL  0.04     Immature Granulocytes (abs)  0.00 - 0.11 K/uL  0.01     NRBC (Absolute)  K/uL  0.00      Ref Range & Units  6mo ago    Creatinine, Urine  mg/dL  77.60     Microalbumin, Urine Random  mg/dL  <0.7      Ref Range & Units  6mo ago    GFR If African American  >60 mL/min/1.73 m 2  59Abnormal       GFR If Non African American  >60 mL/min/1.73 m 2  49Abnormal            Objective:   Vitals obtained by  "patient:  Temp 37.2 °C (99 °F)   Ht 1.753 m (5' 9\")   Wt 68.9 kg (152 lb)      Physical Exam:  Constitutional: Alert, no distress, well-groomed.  Skin: No rashes in visible areas.  Eye: Round. Conjunctiva clear, lids normal. No icterus.   ENMT: Lips pink without lesions, good dentition, moist mucous membranes. Phonation normal.  Neck: No masses, no thyromegaly. Moves freely without pain.  CV: Pulse as reported by patient  Respiratory: Unlabored respiratory effort, no cough or audible wheeze  Psych: Alert and oriented x3, normal affect and mood.       Assessment and Plan:   The following treatment plan was discussed:     Luna was seen today for establish care and other.    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  · We will check for COVID test.  Given letter to patient to be out of work until COVID test results come back.  · Advised patient to take Tylenol as needed for symptoms and advised to be at home, to quarantine herself.    Type 2 diabetes mellitus without complication, without long-term current use of insulin (HCC):  · Recheck labs to check control as patient has not been taking medication.  · Check A1c, CMP, microalbumin creatinine ratio, vitamin B12.    -     Comp Metabolic Panel; Future  -     HEMOGLOBIN A1C; Future  -     MICROALBUMIN CREAT RATIO URINE; Future  -     VITAMIN B12; Future    Medication monitoring encounter  -     Comp Metabolic Panel; Future  -     VITAMIN B12; Future    Dyslipidemia:  · Last lipid panel showed LDL level 100, recheck lipid panel.    -     Lipid Profile; Future    Renal insufficiency:  · Ordered CMP to check for creatinine and GFR.  · Advised patient to hydrate herself well, to drink at least 64 ounces of water in a day    -     Comp " Metabolic Panel; Future    Polyp, intestinal  · Next colonoscopy in 2022 as per patient.       Follow-up: Return in about 1 month (around 7/30/2020).

## 2020-06-30 NOTE — TELEPHONE ENCOUNTER
1. Caller Name: Self                 Call Back Number: home  [facility] PCP or Specialty Provider: No  Used to see Gomez        2.  In the last two weeks, has the patient had any new or worsening symptoms (not explained by alternative diagnosis)? Yes, the patient reports the following COVID-19 consistent symptoms: cough, chills, congestion or runny nose, muscle pain or body aches, headache, diarrhea and symptoms since Saturday.    3.  Does patient have any comoribidities? DM    4.  Has the patient traveled in the last 14 days OR had any known contact with someone who is suspected or confirmed to have COVID-19?  No.    5. Disposition: Offered patient virtual visit to limit potential exposure to others; patient also given self care instructions    Note routed to [facility] Provider: FYI only. - no current provider

## 2020-06-30 NOTE — LETTER
June 30, 2020       Patient: Luna Carpio   YOB: 1955   Date of Visit: 6/30/2020         To Whom It May Concern:    Luna Carpio was seen in office on 6/30/2020 by me. I ordered COVID test. I recommend that she remains out of work until COVID test results come back.      If you have any questions or concerns, please don't hesitate to call [phone]      Sincerely,      Jered Cummings M.D.  Electronically Signed

## 2020-07-03 ENCOUNTER — HOSPITAL ENCOUNTER (OUTPATIENT)
Dept: LAB | Facility: MEDICAL CENTER | Age: 65
End: 2020-07-03
Attending: FAMILY MEDICINE
Payer: COMMERCIAL

## 2020-07-03 DIAGNOSIS — R52 BODY ACHES: ICD-10-CM

## 2020-07-03 DIAGNOSIS — R05.9 COUGH: ICD-10-CM

## 2020-07-03 LAB — COVID ORDER STATUS COVID19: NORMAL

## 2020-07-03 PROCEDURE — U0004 COV-19 TEST NON-CDC HGH THRU: HCPCS | Mod: 91

## 2020-07-03 PROCEDURE — U0003 INFECTIOUS AGENT DETECTION BY NUCLEIC ACID (DNA OR RNA); SEVERE ACUTE RESPIRATORY SYNDROME CORONAVIRUS 2 (SARS-COV-2) (CORONAVIRUS DISEASE [COVID-19]), AMPLIFIED PROBE TECHNIQUE, MAKING USE OF HIGH THROUGHPUT TECHNOLOGIES AS DESCRIBED BY CMS-2020-01-R: HCPCS

## 2020-07-04 LAB
SARS-COV-2 RNA RESP QL NAA+PROBE: NOTDETECTED
SPECIMEN SOURCE: NORMAL

## 2020-07-31 ENCOUNTER — HOSPITAL ENCOUNTER (OUTPATIENT)
Dept: LAB | Facility: MEDICAL CENTER | Age: 65
End: 2020-07-31
Attending: FAMILY MEDICINE
Payer: COMMERCIAL

## 2020-07-31 DIAGNOSIS — E11.9 TYPE 2 DIABETES MELLITUS WITHOUT COMPLICATION, WITHOUT LONG-TERM CURRENT USE OF INSULIN (HCC): ICD-10-CM

## 2020-07-31 DIAGNOSIS — Z51.81 ENCOUNTER FOR THERAPEUTIC DRUG MONITORING: ICD-10-CM

## 2020-07-31 DIAGNOSIS — E78.5 DYSLIPIDEMIA: ICD-10-CM

## 2020-07-31 DIAGNOSIS — N28.9 RENAL INSUFFICIENCY: ICD-10-CM

## 2020-07-31 LAB
ALBUMIN SERPL BCP-MCNC: 4.6 G/DL (ref 3.2–4.9)
ALBUMIN/GLOB SERPL: 1.4 G/DL
ALP SERPL-CCNC: 69 U/L (ref 30–99)
ALT SERPL-CCNC: 14 U/L (ref 2–50)
ANION GAP SERPL CALC-SCNC: 11 MMOL/L (ref 7–16)
AST SERPL-CCNC: 23 U/L (ref 12–45)
BILIRUB SERPL-MCNC: 0.8 MG/DL (ref 0.1–1.5)
BUN SERPL-MCNC: 12 MG/DL (ref 8–22)
CALCIUM SERPL-MCNC: 9.6 MG/DL (ref 8.5–10.5)
CHLORIDE SERPL-SCNC: 103 MMOL/L (ref 96–112)
CHOLEST SERPL-MCNC: 203 MG/DL (ref 100–199)
CO2 SERPL-SCNC: 27 MMOL/L (ref 20–33)
CREAT SERPL-MCNC: 0.86 MG/DL (ref 0.5–1.4)
CREAT UR-MCNC: 113.15 MG/DL
EST. AVERAGE GLUCOSE BLD GHB EST-MCNC: 137 MG/DL
FASTING STATUS PATIENT QL REPORTED: NORMAL
GLOBULIN SER CALC-MCNC: 3.2 G/DL (ref 1.9–3.5)
GLUCOSE SERPL-MCNC: 94 MG/DL (ref 65–99)
HBA1C MFR BLD: 6.4 % (ref 0–5.6)
HDLC SERPL-MCNC: 69 MG/DL
LDLC SERPL CALC-MCNC: 121 MG/DL
MICROALBUMIN UR-MCNC: <1.2 MG/DL
MICROALBUMIN/CREAT UR: NORMAL MG/G (ref 0–30)
POTASSIUM SERPL-SCNC: 4.5 MMOL/L (ref 3.6–5.5)
PROT SERPL-MCNC: 7.8 G/DL (ref 6–8.2)
SODIUM SERPL-SCNC: 141 MMOL/L (ref 135–145)
TRIGL SERPL-MCNC: 67 MG/DL (ref 0–149)

## 2020-07-31 PROCEDURE — 36415 COLL VENOUS BLD VENIPUNCTURE: CPT

## 2020-07-31 PROCEDURE — 82607 VITAMIN B-12: CPT

## 2020-07-31 PROCEDURE — 82570 ASSAY OF URINE CREATININE: CPT

## 2020-07-31 PROCEDURE — 83036 HEMOGLOBIN GLYCOSYLATED A1C: CPT

## 2020-07-31 PROCEDURE — 82043 UR ALBUMIN QUANTITATIVE: CPT

## 2020-07-31 PROCEDURE — 80061 LIPID PANEL: CPT

## 2020-07-31 PROCEDURE — 80053 COMPREHEN METABOLIC PANEL: CPT

## 2020-08-01 LAB — VIT B12 SERPL-MCNC: 872 PG/ML (ref 211–911)

## 2020-08-14 ENCOUNTER — APPOINTMENT (OUTPATIENT)
Dept: MEDICAL GROUP | Facility: MEDICAL CENTER | Age: 65
End: 2020-08-14
Payer: COMMERCIAL

## 2021-04-23 ENCOUNTER — OFFICE VISIT (OUTPATIENT)
Dept: MEDICAL GROUP | Facility: PHYSICIAN GROUP | Age: 66
End: 2021-04-23
Payer: COMMERCIAL

## 2021-04-23 VITALS
WEIGHT: 157 LBS | BODY MASS INDEX: 25.23 KG/M2 | DIASTOLIC BLOOD PRESSURE: 80 MMHG | RESPIRATION RATE: 12 BRPM | HEART RATE: 71 BPM | HEIGHT: 66 IN | OXYGEN SATURATION: 99 % | TEMPERATURE: 97.1 F | SYSTOLIC BLOOD PRESSURE: 110 MMHG

## 2021-04-23 DIAGNOSIS — R14.0 ABDOMINAL BLOATING: ICD-10-CM

## 2021-04-23 DIAGNOSIS — Z12.4 SCREENING FOR CERVICAL CANCER: ICD-10-CM

## 2021-04-23 DIAGNOSIS — M25.561 CHRONIC PAIN OF BOTH KNEES: ICD-10-CM

## 2021-04-23 DIAGNOSIS — M25.552 PAIN OF BOTH HIP JOINTS: ICD-10-CM

## 2021-04-23 DIAGNOSIS — Z12.31 ENCOUNTER FOR SCREENING MAMMOGRAM FOR MALIGNANT NEOPLASM OF BREAST: ICD-10-CM

## 2021-04-23 DIAGNOSIS — M25.551 PAIN OF BOTH HIP JOINTS: ICD-10-CM

## 2021-04-23 DIAGNOSIS — Z12.11 COLON CANCER SCREENING: ICD-10-CM

## 2021-04-23 DIAGNOSIS — M25.562 CHRONIC PAIN OF BOTH KNEES: ICD-10-CM

## 2021-04-23 DIAGNOSIS — E78.5 HYPERLIPIDEMIA ASSOCIATED WITH TYPE 2 DIABETES MELLITUS (HCC): ICD-10-CM

## 2021-04-23 DIAGNOSIS — E11.69 HYPERLIPIDEMIA ASSOCIATED WITH TYPE 2 DIABETES MELLITUS (HCC): ICD-10-CM

## 2021-04-23 DIAGNOSIS — E11.9 TYPE 2 DIABETES MELLITUS WITHOUT COMPLICATION, WITHOUT LONG-TERM CURRENT USE OF INSULIN (HCC): ICD-10-CM

## 2021-04-23 DIAGNOSIS — G89.29 CHRONIC PAIN OF BOTH KNEES: ICD-10-CM

## 2021-04-23 DIAGNOSIS — Z13.0 SCREENING FOR DEFICIENCY ANEMIA: ICD-10-CM

## 2021-04-23 PROBLEM — R05.9 COUGH: Status: RESOLVED | Noted: 2020-06-30 | Resolved: 2021-04-23

## 2021-04-23 PROBLEM — E55.9 VITAMIN D DEFICIENCY: Status: RESOLVED | Noted: 2019-03-25 | Resolved: 2021-04-23

## 2021-04-23 PROBLEM — J30.2 SEASONAL ALLERGIC RHINITIS: Status: RESOLVED | Noted: 2019-03-25 | Resolved: 2021-04-23

## 2021-04-23 PROBLEM — E53.8 VITAMIN B12 DEFICIENCY: Status: RESOLVED | Noted: 2019-03-25 | Resolved: 2021-04-23

## 2021-04-23 PROBLEM — R52 BODY ACHES: Status: RESOLVED | Noted: 2020-06-30 | Resolved: 2021-04-23

## 2021-04-23 PROBLEM — Z51.81 ENCOUNTER FOR THERAPEUTIC DRUG MONITORING: Status: RESOLVED | Noted: 2019-11-18 | Resolved: 2021-04-23

## 2021-04-23 PROBLEM — N28.9 RENAL INSUFFICIENCY: Status: RESOLVED | Noted: 2019-12-15 | Resolved: 2021-04-23

## 2021-04-23 PROBLEM — R94.31 LEFT AXIS DEVIATION: Status: RESOLVED | Noted: 2019-03-25 | Resolved: 2021-04-23

## 2021-04-23 PROCEDURE — 99214 OFFICE O/P EST MOD 30 MIN: CPT | Performed by: INTERNAL MEDICINE

## 2021-04-23 ASSESSMENT — FIBROSIS 4 INDEX: FIB4 SCORE: 1.11

## 2021-04-23 ASSESSMENT — PATIENT HEALTH QUESTIONNAIRE - PHQ9: CLINICAL INTERPRETATION OF PHQ2 SCORE: 0

## 2021-04-23 NOTE — PATIENT INSTRUCTIONS
"Journal for Nurse Practitioners, 15(4), 263-267. Retrieved October 7, 2019 from [email or web address].com/[email or web address]">   Knee Exercises  Ask your health care provider which exercises are safe for you. Do exercises exactly as told by your health care provider and adjust them as directed. It is normal to feel mild stretching, pulling, tightness, or discomfort as you do these exercises. Stop right away if you feel sudden pain or your pain gets worse. Do not begin these exercises until told by your health care provider.  Stretching and range-of-motion exercises  These exercises warm up your muscles and joints and improve the movement and flexibility of your knee. These exercises also help to relieve pain and swelling.  Knee extension, prone  1. Lie on your abdomen (prone position) on a bed.  2. Place your left / right knee just beyond the edge of the surface so your knee is not on the bed. You can put a towel under your left / right thigh just above your kneecap for comfort.  3. Relax your leg muscles and allow gravity to straighten your knee (extension). You should feel a stretch behind your left / right knee.  4. Hold this position for __________ seconds.  5. Scoot up so your knee is supported between repetitions.  Repeat __________ times. Complete this exercise __________ times a day.  Knee flexion, active    1. Lie on your back with both legs straight. If this causes back discomfort, bend your left / right knee so your foot is flat on the floor.  2. Slowly slide your left / right heel back toward your buttocks. Stop when you feel a gentle stretch in the front of your knee or thigh (flexion).  3. Hold this position for __________ seconds.  4. Slowly slide your left / right heel back to the starting position.  Repeat __________ times. Complete this exercise __________ times a day.  Quadriceps stretch, prone    1. Lie on your abdomen on a firm surface, such as a bed or padded floor.  2. Bend your left / right knee and hold " your ankle. If you cannot reach your ankle or pant leg, loop a belt around your foot and grab the belt instead.  3. Gently pull your heel toward your buttocks. Your knee should not slide out to the side. You should feel a stretch in the front of your thigh and knee (quadriceps).  4. Hold this position for __________ seconds.  Repeat __________ times. Complete this exercise __________ times a day.  Hamstring, supine  1. Lie on your back (supine position).  2. Loop a belt or towel over the ball of your left / right foot. The ball of your foot is on the walking surface, right under your toes.  3. Straighten your left / right knee and slowly pull on the belt to raise your leg until you feel a gentle stretch behind your knee (hamstring).  ? Do not let your knee bend while you do this.  ? Keep your other leg flat on the floor.  4. Hold this position for __________ seconds.  Repeat __________ times. Complete this exercise __________ times a day.  Strengthening exercises  These exercises build strength and endurance in your knee. Endurance is the ability to use your muscles for a long time, even after they get tired.  Quadriceps, isometric  This exercise stretches the muscles in front of your thigh (quadriceps) without moving your knee joint (isometric).  1. Lie on your back with your left / right leg extended and your other knee bent. Put a rolled towel or small pillow under your knee if told by your health care provider.  2. Slowly tense the muscles in the front of your left / right thigh. You should see your kneecap slide up toward your hip or see increased dimpling just above the knee. This motion will push the back of the knee toward the floor.  3. For __________ seconds, hold the muscle as tight as you can without increasing your pain.  4. Relax the muscles slowly and completely.  Repeat __________ times. Complete this exercise __________ times a day.  Straight leg raises  This exercise stretches the muscles in front  "of your thigh (quadriceps) and the muscles that move your hips (hip flexors).  1. Lie on your back with your left / right leg extended and your other knee bent.  2. Tense the muscles in the front of your left / right thigh. You should see your kneecap slide up or see increased dimpling just above the knee. Your thigh may even shake a bit.  3. Keep these muscles tight as you raise your leg 4-6 inches (10-15 cm) off the floor. Do not let your knee bend.  4. Hold this position for __________ seconds.  5. Keep these muscles tense as you lower your leg.  6. Relax your muscles slowly and completely after each repetition.  Repeat __________ times. Complete this exercise __________ times a day.  Hamstring, isometric  1. Lie on your back on a firm surface.  2. Bend your left / right knee about __________ degrees.  3. Dig your left / right heel into the surface as if you are trying to pull it toward your buttocks. Tighten the muscles in the back of your thighs (hamstring) to \"dig\" as hard as you can without increasing any pain.  4. Hold this position for __________ seconds.  5. Release the tension gradually and allow your muscles to relax completely for __________ seconds after each repetition.  Repeat __________ times. Complete this exercise __________ times a day.  Hamstring curls  If told by your health care provider, do this exercise while wearing ankle weights. Begin with __________ lb weights. Then increase the weight by 1 lb (0.5 kg) increments. Do not wear ankle weights that are more than __________ lb.  1. Lie on your abdomen with your legs straight.  2. Bend your left / right knee as far as you can without feeling pain. Keep your hips flat against the floor.  3. Hold this position for __________ seconds.  4. Slowly lower your leg to the starting position.  Repeat __________ times. Complete this exercise __________ times a day.  Squats  This exercise strengthens the muscles in front of your thigh and knee " (quadriceps).  1.  front of a table, with your feet and knees pointing straight ahead. You may rest your hands on the table for balance but not for support.  2. Slowly bend your knees and lower your hips like you are going to sit in a chair.  ? Keep your weight over your heels, not over your toes.  ? Keep your lower legs upright so they are parallel with the table legs.  ? Do not let your hips go lower than your knees.  ? Do not bend lower than told by your health care provider.  ? If your knee pain increases, do not bend as low.  3. Hold the squat position for __________ seconds.  4. Slowly push with your legs to return to standing. Do not use your hands to pull yourself to standing.  Repeat __________ times. Complete this exercise __________ times a day.  Wall slides  This exercise strengthens the muscles in front of your thigh and knee (quadriceps).  1. Lean your back against a smooth wall or door, and walk your feet out 18-24 inches (46-61 cm) from it.  2. Place your feet hip-width apart.  3. Slowly slide down the wall or door until your knees bend __________ degrees. Keep your knees over your heels, not over your toes. Keep your knees in line with your hips.  4. Hold this position for __________ seconds.  Repeat __________ times. Complete this exercise __________ times a day.  Straight leg raises  This exercise strengthens the muscles that rotate the leg at the hip and move it away from your body (hip abductors).  1. Lie on your side with your left / right leg in the top position. Lie so your head, shoulder, knee, and hip line up. You may bend your bottom knee to help you keep your balance.  2. Roll your hips slightly forward so your hips are stacked directly over each other and your left / right knee is facing forward.  3. Leading with your heel, lift your top leg 4-6 inches (10-15 cm). You should feel the muscles in your outer hip lifting.  ? Do not let your foot drift forward.  ? Do not let your knee  roll toward the ceiling.  4. Hold this position for __________ seconds.  5. Slowly return your leg to the starting position.  6. Let your muscles relax completely after each repetition.  Repeat __________ times. Complete this exercise __________ times a day.  Straight leg raises  This exercise stretches the muscles that move your hips away from the front of the pelvis (hip extensors).  1. Lie on your abdomen on a firm surface. You can put a pillow under your hips if that is more comfortable.  2. Tense the muscles in your buttocks and lift your left / right leg about 4-6 inches (10-15 cm). Keep your knee straight as you lift your leg.  3. Hold this position for __________ seconds.  4. Slowly lower your leg to the starting position.  5. Let your leg relax completely after each repetition.  Repeat __________ times. Complete this exercise __________ times a day.  This information is not intended to replace advice given to you by your health care provider. Make sure you discuss any questions you have with your health care provider.  Document Released: 11/01/2006 Document Revised: 10/08/2019 Document Reviewed: 10/08/2019  [employer]vier Patient Education © 2020 [employer]      Hip Exercises  Ask your health care provider which exercises are safe for you. Do exercises exactly as told by your health care provider and adjust them as directed. It is normal to feel mild stretching, pulling, tightness, or discomfort as you do these exercises. Stop right away if you feel sudden pain or your pain gets worse. Do not begin these exercises until told by your health care provider.  Stretching and range-of-motion exercises  These exercises warm up your muscles and joints and improve the movement and flexibility of your hip. These exercises also help to relieve pain, numbness, and tingling. You may be asked to limit your range of motion if you had a hip replacement. Talk to your health care provider about these restrictions.  Hamstrings,  supine    1. Lie on your back (supine position).  2. Loop a belt or towel over the ball of your left / right foot. The ball of your foot is on the walking surface, right under your toes.  3. Straighten your left / right knee and slowly pull on the belt or towel to raise your leg until you feel a gentle stretch behind your knee (hamstring).  ? Do not let your knee bend while you do this.  ? Keep your other leg flat on the floor.  4. Hold this position for __________ seconds.  5. Slowly return your leg to the starting position.  Repeat __________ times. Complete this exercise __________ times a day.  Hip rotation    1. Lie on your back on a firm surface.  2. With your left / right hand, gently pull your left / right knee toward the shoulder that is on the same side of the body. Stop when your knee is pointing toward the ceiling.  3. Hold your left / right ankle with your other hand.  4. Keeping your knee steady, gently pull your left / right ankle toward your other shoulder until you feel a stretch in your buttocks.  ? Keep your hips and shoulders firmly planted while you do this stretch.  5. Hold this position for __________ seconds.  Repeat __________ times. Complete this exercise __________ times a day.  Seated stretch  This exercise is sometimes called hamstrings and adductors stretch.  1. Sit on the floor with your legs stretched wide. Keep your knees straight during this exercise.  2. Keeping your head and back in a straight line, bend at your waist to reach for your left foot (position A). You should feel a stretch in your right inner thigh (adductors).  3. Hold this position for __________ seconds. Then slowly return to the upright position.  4. Keeping your head and back in a straight line, bend at your waist to reach forward (position B). You should feel a stretch behind both of your thighs and knees (hamstrings).  5. Hold this position for __________ seconds. Then slowly return to the upright  position.  6. Keeping your head and back in a straight line, bend at your waist to reach for your right foot (position C). You should feel a stretch in your left inner thigh (adductors).  7. Hold this position for __________ seconds. Then slowly return to the upright position.  Repeat __________ times. Complete this exercise __________ times a day.  Lunge  This exercise stretches the muscles of the hip (hip flexors).  1. Place your left / right knee on the floor and bend your other knee so that is directly over your ankle. You should be half-kneeling.  2. Keep good posture with your head over your shoulders.  3. Tighten your buttocks to point your tailbone downward. This will prevent your back from arching too much.  4. You should feel a gentle stretch in the front of your left / right thigh and hip. If you do not feel a stretch, slide your other foot forward slightly and then slowly lunge forward with your chest up until your knee once again lines up over your ankle.  ? Make sure your tailbone continues to point downward.  5. Hold this position for __________ seconds.  6. Slowly return to the starting position.  Repeat __________ times. Complete this exercise __________ times a day.  Strengthening exercises  These exercises build strength and endurance in your hip. Endurance is the ability to use your muscles for a long time, even after they get tired.  Bridge  This exercise strengthens the muscles of your hip (hip extensors).  1. Lie on your back on a firm surface with your knees bent and your feet flat on the floor.  2. Tighten your buttocks muscles and lift your bottom off the floor until the trunk of your body and your hips are level with your thighs.  ? Do not arch your back.  ? You should feel the muscles working in your buttocks and the back of your thighs. If you do not feel these muscles, slide your feet 1-2 inches (2.5-5 cm) farther away from your buttocks.  3. Hold this position for __________  seconds.  4. Slowly lower your hips to the starting position.  5. Let your muscles relax completely between repetitions.  Repeat __________ times. Complete this exercise __________ times a day.  Straight leg raises, side-lying  This exercise strengthens the muscles that move the hip joint away from the center of the body (hip abductors).  1. Lie on your side with your left / right leg in the top position. Lie so your head, shoulder, hip, and knee line up. You may bend your bottom knee slightly to help you balance.  2. Roll your hips slightly forward, so your hips are stacked directly over each other and your left / right knee is facing forward.  3. Leading with your heel, lift your top leg 4-6 inches (10-15 cm). You should feel the muscles in your top hip lifting.  ? Do not let your foot drift forward.  ? Do not let your knee roll toward the ceiling.  4. Hold this position for __________ seconds.  5. Slowly return to the starting position.  6. Let your muscles relax completely between repetitions.  Repeat __________ times. Complete this exercise __________ times a day.  Straight leg raises, side-lying  This exercise strengthen the muscles that move the hip joint toward the center of the body (hip adductors).  1. Lie on your side with your left / right leg in the bottom position. Lie so your head, shoulder, hip, and knee line up. You may place your upper foot in front to help you balance.  2. Roll your hips slightly forward, so your hips are stacked directly over each other and your left / right knee is facing forward.  3. Tense the muscles in your inner thigh and lift your bottom leg 4-6 inches (10-15 cm).  4. Hold this position for __________ seconds.  5. Slowly return to the starting position.  6. Let your muscles relax completely between repetitions.  Repeat __________ times. Complete this exercise __________ times a day.  Straight leg raises, supine  This exercise strengthens the muscles in the front of your  thigh (quadriceps).  1. Lie on your back (supine position) with your left / right leg extended and your other knee bent.  2. Tense the muscles in the front of your left / right thigh. You should see your kneecap slide up or see increased dimpling just above your knee.  3. Keep these muscles tight as you raise your leg 4-6 inches (10-15 cm) off the floor. Do not let your knee bend.  4. Hold this position for __________ seconds.  5. Keep these muscles tense as you lower your leg.  6. Relax the muscles slowly and completely between repetitions.  Repeat __________ times. Complete this exercise __________ times a day.  Hip abductors, standing  This exercise strengthens the muscles that move the leg and hip joint away from the center of the body (hip abductors).  1. Tie one end of a rubber exercise band or tubing to a secure surface, such as a chair, table, or pole.  2. Loop the other end of the band or tubing around your left / right ankle.  3. Keeping your ankle with the band or tubing directly opposite the secured end, step away until there is tension in the tubing or band. Hold on to a chair, table, or pole as needed for balance.  4. Lift your left / right leg out to your side. While you do this:  ? Keep your back upright.  ? Keep your shoulders over your hips.  ? Keep your toes pointing forward.  ? Make sure to use your hip muscles to slowly lift your leg. Do not tip your body or forcefully lift your leg.  5. Hold this position for __________ seconds.  6. Slowly return to the starting position.  Repeat __________ times. Complete this exercise __________ times a day.  Squats  This exercise strengthens the muscles in the front of your thigh (quadriceps).  1.  a door frame so your feet and knees are in line with the frame. You may place your hands on the frame for balance.  2. Slowly bend your knees and lower your hips like you are going to sit in a chair.  ? Keep your lower legs in a straight-up-and-down  position.  ? Do not let your hips go lower than your knees.  ? Do not bend your knees lower than told by your health care provider.  ? If your hip pain increases, do not bend as low.  3. Hold this position for ___________ seconds.  4. Slowly push with your legs to return to standing. Do not use your hands to pull yourself to standing.  Repeat __________ times. Complete this exercise __________ times a day.  This information is not intended to replace advice given to you by your health care provider. Make sure you discuss any questions you have with your health care provider.  Document Released: 01/05/2007 Document Revised: 10/29/2019 Document Reviewed: 10/29/2019  Elsevier Patient Education © 2020 [employer]

## 2021-04-23 NOTE — PROGRESS NOTES
Subjective:     CC: Establish care    HISTORY OF THE PRESENT ILLNESS: Patient is a 65 y.o. female. This pleasant patient is here today to establish care and discuss the following issues:    The patient reports feeling bloated for the last 1 to 2 months after eating.  She reports a history of constipation.  She denies any abdominal pain, nausea or vomiting.  She has had no unintentional weight loss.  She is concerned because her sister had a similar issue and required surgery.    She also reports bilateral hip and knee pain that is chronic.  Hip x-ray in 2019 showed mild degenerative disease.  She takes Aleve approximately every 2 weeks which does help significantly.  She has been doing some yoga stretching for the pain.      Allergies: Patient has no known allergies.    No current Epic-ordered outpatient medications on file.     No current Epic-ordered facility-administered medications on file.       Past Medical History:   Diagnosis Date   • Allergy    • Body aches 6/30/2020   • Cough 6/30/2020   • Dyslipidemia 3/25/2019   • Left axis deviation 3/25/2019   • Medication monitoring encounter 11/18/2019     IMO load March 2020   • Renal insufficiency 12/15/2019   • Type 2 diabetes mellitus without complication, without long-term current use of insulin ([facility]) 7/1/2019   • Vitamin B12 deficiency 3/25/2019   • Vitamin D deficiency 3/25/2019       Past Surgical History:   Procedure Laterality Date   • PRIMARY C SECTION         Social History     Tobacco Use   • Smoking status: Never Smoker   • Smokeless tobacco: Never Used   Substance Use Topics   • Alcohol use: Yes     Comment: Occassionally   • Drug use: No       Social History     Social History Narrative   • Not on file       Family History   Problem Relation Age of Onset   • Diabetes Mother    • Hypertension Father        Health Maintenance: Completed    ROS:   Gen: no fevers/chills  Pulm: no sob, no cough  CV: no chest pain, no palpitations  GI: no nausea/vomiting,  "no diarrhea  Neuro: no headaches, no numbness/tingling      Objective:     Exam: /80   Pulse 71   Temp 36.2 °C (97.1 °F)   Resp 12   Ht 1.676 m (5' 6\")   Wt 71.2 kg (157 lb)   SpO2 99%  Body mass index is 25.34 kg/m².    General: Normal appearing. No distress.  HEENT: Normocephalic. Eyes conjunctiva clear lids without ptosis, pupils equal and reactive to light accommodation, oropharynx is without erythema, edema or exudates.   Pulmonary: Clear to ausculation.  Normal effort. No rales, ronchi, or wheezing.  Cardiovascular: Regular rate and rhythm without murmur.   Abdomen: Soft, nontender, nondistended.   Musculoskeletal: No extremity cyanosis, clubbing, or edema.  Psych: Normal mood and affect. Alert and oriented x3. Judgment and insight is normal.    Labs: Reviewed and discussed with patient.    Assessment & Plan:   65 y.o. female with the following -    Type 2 diabetes mellitus without complication, without long-term current use of insulin (HCC)  This is a chronic condition.  Labs from 7/31/2020 showed a hemoglobin A1c of 6.4.  She is not on any medications for this condition.  - Comp Metabolic Panel; Future  - HEMOGLOBIN A1C; Future    Hyperlipidemia associated with type 2 diabetes mellitus (HCC)  This is a chronic condition.  Lipid panel from 7/31/2020 showed a total cholesterol 203, , HDL 69, triglycerides 67.  He is not on any medications for this condition.  - Lipid Profile; Future    Abdominal bloating  This is an acute condition.  Patient reports abdominal bloating over the last 1 to 2 months.  No unintentional weight loss.  She is very concerned because her sister had a similar condition that required surgery.  She is requesting CT of the abdomen and pelvis for further evaluation.  - CT-ABDOMEN-PELVIS WITH & W/O; Future    Chronic pain of both knees  Pain of both hip joints  This is a chronic condition.  Patient gets symptomatic relief with occasional Aleve.  -Patient was given handouts " on hip and knee stretching exercises  -Informed patient she could take Aleve more frequently as it helps with the pain  -Offered referral for corticosteroid injections and/or physical therapy, patient declined at this time    Encounter for screening mammogram for malignant neoplasm of breast  - MA-SCREENING MAMMO BILAT W/TOMOSYNTHESIS W/CAD; Future    Colon cancer screening  - REFERRAL TO GI FOR COLONOSCOPY    Screening for deficiency anemia  - CBC WITHOUT DIFFERENTIAL; Future  - FERRITIN; Future  - IRON/TOTAL IRON BIND; Future    Screening for cervical cancer  - REFERRAL TO GYNECOLOGY    Return in about 6 months (around 10/23/2021) for f/u labs.    Please note that this dictation was created using voice recognition software. I have made every reasonable attempt to correct obvious errors, but I expect that there are errors of grammar and possibly content that I did not discover before finalizing the note.

## 2021-05-22 ENCOUNTER — HOSPITAL ENCOUNTER (OUTPATIENT)
Dept: RADIOLOGY | Facility: MEDICAL CENTER | Age: 66
End: 2021-05-22
Payer: COMMERCIAL

## 2021-05-24 ENCOUNTER — HOSPITAL ENCOUNTER (OUTPATIENT)
Dept: LAB | Facility: MEDICAL CENTER | Age: 66
End: 2021-05-24
Attending: INTERNAL MEDICINE
Payer: COMMERCIAL

## 2021-05-24 ENCOUNTER — HOSPITAL ENCOUNTER (OUTPATIENT)
Facility: MEDICAL CENTER | Age: 66
End: 2021-05-24
Attending: OBSTETRICS & GYNECOLOGY
Payer: COMMERCIAL

## 2021-05-24 ENCOUNTER — GYNECOLOGY VISIT (OUTPATIENT)
Dept: OBGYN | Facility: CLINIC | Age: 66
End: 2021-05-24
Payer: COMMERCIAL

## 2021-05-24 VITALS — DIASTOLIC BLOOD PRESSURE: 77 MMHG | WEIGHT: 157 LBS | SYSTOLIC BLOOD PRESSURE: 113 MMHG | BODY MASS INDEX: 25.34 KG/M2

## 2021-05-24 DIAGNOSIS — Z12.4 CERVICAL CANCER SCREENING: ICD-10-CM

## 2021-05-24 DIAGNOSIS — Z00.00 ANNUAL PHYSICAL EXAM: ICD-10-CM

## 2021-05-24 DIAGNOSIS — E11.69 HYPERLIPIDEMIA ASSOCIATED WITH TYPE 2 DIABETES MELLITUS (HCC): ICD-10-CM

## 2021-05-24 DIAGNOSIS — E11.9 TYPE 2 DIABETES MELLITUS WITHOUT COMPLICATION, WITHOUT LONG-TERM CURRENT USE OF INSULIN (HCC): ICD-10-CM

## 2021-05-24 DIAGNOSIS — Z13.0 SCREENING FOR DEFICIENCY ANEMIA: ICD-10-CM

## 2021-05-24 DIAGNOSIS — R14.0 ABDOMINAL BLOATING: ICD-10-CM

## 2021-05-24 DIAGNOSIS — E78.5 HYPERLIPIDEMIA ASSOCIATED WITH TYPE 2 DIABETES MELLITUS (HCC): ICD-10-CM

## 2021-05-24 LAB
ALBUMIN SERPL BCP-MCNC: 4.3 G/DL (ref 3.2–4.9)
ALBUMIN/GLOB SERPL: 1.3 G/DL
ALP SERPL-CCNC: 70 U/L (ref 30–99)
ALT SERPL-CCNC: 10 U/L (ref 2–50)
ANION GAP SERPL CALC-SCNC: 9 MMOL/L (ref 7–16)
AST SERPL-CCNC: 22 U/L (ref 12–45)
BILIRUB SERPL-MCNC: 1 MG/DL (ref 0.1–1.5)
BUN SERPL-MCNC: 14 MG/DL (ref 8–22)
CALCIUM SERPL-MCNC: 9.3 MG/DL (ref 8.5–10.5)
CHLORIDE SERPL-SCNC: 104 MMOL/L (ref 96–112)
CHOLEST SERPL-MCNC: 202 MG/DL (ref 100–199)
CO2 SERPL-SCNC: 27 MMOL/L (ref 20–33)
CREAT SERPL-MCNC: 0.84 MG/DL (ref 0.5–1.4)
ERYTHROCYTE [DISTWIDTH] IN BLOOD BY AUTOMATED COUNT: 48.5 FL (ref 35.9–50)
EST. AVERAGE GLUCOSE BLD GHB EST-MCNC: 140 MG/DL
FASTING STATUS PATIENT QL REPORTED: NORMAL
FERRITIN SERPL-MCNC: 20.4 NG/ML (ref 10–291)
GLOBULIN SER CALC-MCNC: 3.4 G/DL (ref 1.9–3.5)
GLUCOSE SERPL-MCNC: 96 MG/DL (ref 65–99)
HBA1C MFR BLD: 6.5 % (ref 4–5.6)
HCT VFR BLD AUTO: 37.1 % (ref 37–47)
HDLC SERPL-MCNC: 58 MG/DL
HGB BLD-MCNC: 11.7 G/DL (ref 12–16)
IRON SATN MFR SERPL: 27 % (ref 15–55)
IRON SERPL-MCNC: 90 UG/DL (ref 40–170)
LDLC SERPL CALC-MCNC: 129 MG/DL
MCH RBC QN AUTO: 29.5 PG (ref 27–33)
MCHC RBC AUTO-ENTMCNC: 31.5 G/DL (ref 33.6–35)
MCV RBC AUTO: 93.7 FL (ref 81.4–97.8)
PLATELET # BLD AUTO: 309 K/UL (ref 164–446)
PMV BLD AUTO: 10 FL (ref 9–12.9)
POTASSIUM SERPL-SCNC: 4.1 MMOL/L (ref 3.6–5.5)
PROT SERPL-MCNC: 7.7 G/DL (ref 6–8.2)
RBC # BLD AUTO: 3.96 M/UL (ref 4.2–5.4)
SODIUM SERPL-SCNC: 140 MMOL/L (ref 135–145)
TIBC SERPL-MCNC: 329 UG/DL (ref 250–450)
TRIGL SERPL-MCNC: 76 MG/DL (ref 0–149)
UIBC SERPL-MCNC: 239 UG/DL (ref 110–370)
WBC # BLD AUTO: 5.5 K/UL (ref 4.8–10.8)

## 2021-05-24 PROCEDURE — 87624 HPV HI-RISK TYP POOLED RSLT: CPT

## 2021-05-24 PROCEDURE — 82728 ASSAY OF FERRITIN: CPT

## 2021-05-24 PROCEDURE — 99386 PREV VISIT NEW AGE 40-64: CPT | Performed by: OBSTETRICS & GYNECOLOGY

## 2021-05-24 PROCEDURE — 83550 IRON BINDING TEST: CPT

## 2021-05-24 PROCEDURE — 83036 HEMOGLOBIN GLYCOSYLATED A1C: CPT

## 2021-05-24 PROCEDURE — 88175 CYTOPATH C/V AUTO FLUID REDO: CPT

## 2021-05-24 PROCEDURE — 80061 LIPID PANEL: CPT

## 2021-05-24 PROCEDURE — 85027 COMPLETE CBC AUTOMATED: CPT

## 2021-05-24 PROCEDURE — 83540 ASSAY OF IRON: CPT

## 2021-05-24 PROCEDURE — 80053 COMPREHEN METABOLIC PANEL: CPT

## 2021-05-24 PROCEDURE — 36415 COLL VENOUS BLD VENIPUNCTURE: CPT

## 2021-05-24 ASSESSMENT — FIBROSIS 4 INDEX: FIB4 SCORE: 1.11
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
## 2021-05-25 LAB
CYTOLOGY REG CYTOL: NORMAL
HPV HR 12 DNA CVX QL NAA+PROBE: NEGATIVE
HPV16 DNA SPEC QL NAA+PROBE: NEGATIVE
HPV18 DNA SPEC QL NAA+PROBE: NEGATIVE
SPECIMEN SOURCE: NORMAL

## 2021-05-28 ENCOUNTER — APPOINTMENT (OUTPATIENT)
Dept: RADIOLOGY | Facility: MEDICAL CENTER | Age: 66
End: 2021-05-28
Attending: INTERNAL MEDICINE
Payer: COMMERCIAL

## 2021-06-04 ENCOUNTER — OFFICE VISIT (OUTPATIENT)
Dept: MEDICAL GROUP | Facility: PHYSICIAN GROUP | Age: 66
End: 2021-06-04
Payer: COMMERCIAL

## 2021-06-04 VITALS
DIASTOLIC BLOOD PRESSURE: 64 MMHG | SYSTOLIC BLOOD PRESSURE: 120 MMHG | BODY MASS INDEX: 25.66 KG/M2 | TEMPERATURE: 98.7 F | OXYGEN SATURATION: 100 % | HEIGHT: 65 IN | RESPIRATION RATE: 14 BRPM | HEART RATE: 69 BPM | WEIGHT: 154 LBS

## 2021-06-04 DIAGNOSIS — M77.8 TRICEPS TENDONITIS: ICD-10-CM

## 2021-06-04 DIAGNOSIS — E11.9 TYPE 2 DIABETES MELLITUS WITHOUT COMPLICATION, WITHOUT LONG-TERM CURRENT USE OF INSULIN (HCC): ICD-10-CM

## 2021-06-04 DIAGNOSIS — E78.5 HYPERLIPIDEMIA ASSOCIATED WITH TYPE 2 DIABETES MELLITUS (HCC): ICD-10-CM

## 2021-06-04 DIAGNOSIS — E11.69 HYPERLIPIDEMIA ASSOCIATED WITH TYPE 2 DIABETES MELLITUS (HCC): ICD-10-CM

## 2021-06-04 DIAGNOSIS — D64.9 ANEMIA, UNSPECIFIED TYPE: ICD-10-CM

## 2021-06-04 DIAGNOSIS — R14.0 ABDOMINAL BLOATING: ICD-10-CM

## 2021-06-04 PROCEDURE — 99214 OFFICE O/P EST MOD 30 MIN: CPT | Performed by: INTERNAL MEDICINE

## 2021-06-04 ASSESSMENT — FIBROSIS 4 INDEX: FIB4 SCORE: 1.44

## 2021-06-04 NOTE — PROGRESS NOTES
"Subjective:     CC: Follow-up on lab results    HPI:   Luna presents today to discuss the following issues:    The patient reports left arm pain for 1 week.  She recently started lifting weights.  The location of the pain is at the area of the triceps distal insertion point.    Past Medical History:   Diagnosis Date   • Allergy    • Anemia 6/4/2021   • Body aches 6/30/2020   • Cough 6/30/2020   • Dyslipidemia 3/25/2019   • Left axis deviation 3/25/2019   • Medication monitoring encounter 11/18/2019     IMO load March 2020   • Renal insufficiency 12/15/2019   • Type 2 diabetes mellitus without complication, without long-term current use of insulin ([facility]) 7/1/2019   • Vitamin B12 deficiency 3/25/2019   • Vitamin D deficiency 3/25/2019       Social History     Tobacco Use   • Smoking status: Never Smoker   • Smokeless tobacco: Never Used   Vaping Use   • Vaping Use: Never used   Substance Use Topics   • Alcohol use: Yes     Comment: Occassionally   • Drug use: No       Current Outpatient Medications Ordered in Epic   Medication Sig Dispense Refill   • metFORMIN (GLUCOPHAGE) 500 MG Tab Take 1 tablet by mouth 2 times a day with meals. 60 tablet 3     No current Epic-ordered facility-administered medications on file.       Allergies:  Patient has no known allergies.    Health Maintenance: Completed    ROS:   Gen: no fevers/chills  Pulm: no sob, no cough  CV: no chest pain, no palpitations  GI: no nausea/vomiting, no diarrhea  Neuro: no headaches, no numbness/tingling      Objective:     Exam:  /64   Pulse 69   Temp 37.1 °C (98.7 °F)   Resp 14   Ht 1.651 m (5' 5\")   Wt 69.9 kg (154 lb)   SpO2 100%   BMI 25.63 kg/m²  Body mass index is 25.63 kg/m².    Gen: Alert and oriented, No apparent distress.  MSK:  Tenderness to palpation over the distal triceps insertion      Assessment & Plan:     65 y.o. female with the following -     Type 2 diabetes mellitus without complication, without long-term current use of " insulin (HCC)  This is a chronic condition.    Labs from 5/24/2021 showed an elevated hemoglobin A1c of 6.5, stable from prior values.  The patient had a normal microalbumin creatinine ratio on 7/31/2020.  She is not on any medications for this condition.  -Start Metformin 500 mg daily, advancing to 500 mg twice daily  -We will repeat hemoglobin A1c in 3 months  - metFORMIN (GLUCOPHAGE) 500 MG Tab; Take 1 tablet by mouth 2 times a day with meals.  Dispense: 60 tablet; Refill: 3  - HEMOGLOBIN A1C; Future     Hyperlipidemia associated with type 2 diabetes mellitus (HCC)  This is a chronic condition.    Not at goal.  Lipid panel from 5/24/2021 showed a total cholesterol 202, , HDL 58, triglycerides 76.  The 10-year ASCVD risk score ([place] DC Jr., et al., 2013) is: 8.2% She is not on any medications for this condition.  -Continue dietary management with a low-cholesterol diet   -We will repeat lipid panel in 6 months and begin treatment if indicated     Abdominal bloating  This is a chronic condition.  Improved.  A CT of the abdomen and pelvis was ordered at her last visit, the patient states that she did not have this time as she is reporting improvement in her abdominal bloating with dietary management.  She has decreased her intake of yogurt, rice, and bread and noticed significant improvement. No unintentional weight loss.      Anemia, unspecified type  This is an acute condition.  Labs from 5/24/2021 showed a reduced hemoglobin of 11.7 with a normal hematocrit of 37.1.  MCV was normal at 93.7.  Iron studies were normal.  Ferritin was on the low end of normal at 20.4.  The patient reports a history of iron deficiency anemia in the past.  -Recommended prenatal vitamin containing iron that would be gentler on the patient's GI system    Triceps tendonitis  This is an acute condition.  The patient is reporting pain over her left tricep area since she started weightlifting.  -Advised to rest, ice, NSAIDs     Return  in about 6 months (around 12/4/2021) for f/u labs.    Please note that this dictation was created using voice recognition software. I have made every reasonable attempt to correct obvious errors, but I expect that there are errors of grammar and possibly content that I did not discover before finalizing the note.

## 2021-06-21 ENCOUNTER — HOSPITAL ENCOUNTER (OUTPATIENT)
Dept: RADIOLOGY | Facility: MEDICAL CENTER | Age: 66
End: 2021-06-21
Attending: INTERNAL MEDICINE
Payer: COMMERCIAL

## 2021-06-21 DIAGNOSIS — Z12.31 ENCOUNTER FOR SCREENING MAMMOGRAM FOR MALIGNANT NEOPLASM OF BREAST: ICD-10-CM

## 2021-06-21 PROCEDURE — 77063 BREAST TOMOSYNTHESIS BI: CPT

## 2021-08-20 ENCOUNTER — OFFICE VISIT (OUTPATIENT)
Dept: MEDICAL GROUP | Facility: PHYSICIAN GROUP | Age: 66
End: 2021-08-20
Payer: COMMERCIAL

## 2021-08-20 VITALS
RESPIRATION RATE: 14 BRPM | WEIGHT: 154 LBS | SYSTOLIC BLOOD PRESSURE: 120 MMHG | OXYGEN SATURATION: 98 % | HEIGHT: 65 IN | HEART RATE: 70 BPM | BODY MASS INDEX: 25.66 KG/M2 | TEMPERATURE: 97.9 F | DIASTOLIC BLOOD PRESSURE: 70 MMHG

## 2021-08-20 DIAGNOSIS — G89.29 CHRONIC PAIN OF RIGHT KNEE: ICD-10-CM

## 2021-08-20 DIAGNOSIS — M25.522 LEFT ELBOW PAIN: ICD-10-CM

## 2021-08-20 DIAGNOSIS — E11.69 DYSLIPIDEMIA ASSOCIATED WITH TYPE 2 DIABETES MELLITUS (HCC): ICD-10-CM

## 2021-08-20 DIAGNOSIS — D64.9 ANEMIA, UNSPECIFIED TYPE: ICD-10-CM

## 2021-08-20 DIAGNOSIS — M54.2 NECK PAIN: ICD-10-CM

## 2021-08-20 DIAGNOSIS — M25.561 CHRONIC PAIN OF RIGHT KNEE: ICD-10-CM

## 2021-08-20 DIAGNOSIS — E78.5 DYSLIPIDEMIA ASSOCIATED WITH TYPE 2 DIABETES MELLITUS (HCC): ICD-10-CM

## 2021-08-20 PROCEDURE — 99214 OFFICE O/P EST MOD 30 MIN: CPT | Performed by: INTERNAL MEDICINE

## 2021-08-20 ASSESSMENT — FIBROSIS 4 INDEX: FIB4 SCORE: 1.44

## 2021-08-20 NOTE — PROGRESS NOTES
"Subjective:     CC: Follow-up on medical problems    HPI:   Luna presents today to discuss the following issues:    The patient reports continued left elbow pain.  She is also having knee pain, right greater than left, and neck pain.  She works at the [employer] and is on her feet approximately 9 hours daily.      Past Medical History:   Diagnosis Date   • Allergy    • Anemia 6/4/2021   • Body aches 6/30/2020   • Cough 6/30/2020   • Dyslipidemia 3/25/2019   • Left axis deviation 3/25/2019   • Medication monitoring encounter 11/18/2019     IMO load March 2020   • Renal insufficiency 12/15/2019   • Type 2 diabetes mellitus without complication, without long-term current use of insulin (HCC) 7/1/2019   • Vitamin B12 deficiency 3/25/2019   • Vitamin D deficiency 3/25/2019       Social History     Tobacco Use   • Smoking status: Never Smoker   • Smokeless tobacco: Never Used   Vaping Use   • Vaping Use: Never used   Substance Use Topics   • Alcohol use: Yes     Comment: Occassionally   • Drug use: No       Current Outpatient Medications Ordered in Epic   Medication Sig Dispense Refill   • metFORMIN (GLUCOPHAGE) 500 MG Tab Take 1 tablet by mouth 2 times a day with meals. 60 tablet 3     No current Epic-ordered facility-administered medications on file.       Allergies:  Patient has no known allergies.    Health Maintenance: Completed    ROS:   Denies any recent fevers or chills. No nausea or vomiting. No chest pains or shortness of breath.      Objective:     Exam:  /70   Pulse 70   Temp 36.6 °C (97.9 °F)   Resp 14   Ht 1.651 m (5' 5\")   Wt 69.9 kg (154 lb)   SpO2 98%   BMI 25.63 kg/m²  Body mass index is 25.63 kg/m².    Gen: Alert and oriented, No apparent distress.  Lungs: Normal effort, CTA bilaterally, no wheezes, rhonchi, or rales  CV: Regular rate and rhythm. No murmurs, rubs, or gallops.  Ext: No clubbing, cyanosis, edema.      Assessment & Plan:     65 y.o. female with the following -     Dyslipidemia " associated with type 2 diabetes mellitus (HCC)  This is a chronic condition.   With regard to the patient's diabetes, it is well controlled.  Hemoglobin A1c from 5/24/2021 was elevated at 6.5.  The patient had a normal microalbumin creatinine ratio on 7/31/2020.    The patient was started on Metformin advancing up from 500 mg daily to 500 mg twice daily at her last visit on 6/4/2021.  Regard to the patient's dyslipidemia, it is not at goal.  Lipid panel from 5/24/2021 showed an elevated total cholesterol 202, , HDL 50, triglycerides 76. The 10-year ASCVD risk score ([place] JOSH Jr., et al., 2013) is: 8.2% The patient is not currently on a statin medication.  -Continue metformin 500 mg twice daily, repeat hemoglobin A1c and increase dose if indicated  -Continue dietary management with a low-cholesterol diet, repeat lipid panel and begin treatment if indicated  - HEMOGLOBIN A1C; Future  - MICROALBUMIN CREAT RATIO URINE; Future  - Lipid Profile; Future    Anemia, unspecified type  This is a chronic condition. Stable. Labs from 5/24/2021 showed a reduced hemoglobin of 11.7 with a normal hematocrit of 37.1.  MCV was normal at 93.7.  Iron studies were normal.  Ferritin was on the low end of normal at 20.4.  The patient reports a history of iron deficiency anemia in the past.  She started taking iron, but was unable to tolerate it secondary to constipation.  -Recommended OTC prenatal vitamin containing iron that would be gentler on the patient's GI system  - CBC WITHOUT DIFFERENTIAL; Future  - IRON/TOTAL IRON BIND; Future  - FERRITIN; Future    Left elbow pain  Chronic pain of right knee  Neck pain  This is a chronic condition.  Progressive.  Patient reporting increased arthralgias secondary to being on her feet many hours a day at work.  Will obtain x-ray of the right knee and C-spine.  Refer to orthopedics for further evaluation and treatment.   - REFERRAL TO ORTHOPEDICS  - DX-KNEE 3 VIEWS RIGHT; Future  - DX-CERVICAL  SPINE-2 OR 3 VIEWS; Future    Return in about 6 months (around 2/20/2022) for f/u labs.    Please note that this dictation was created using voice recognition software. I have made every reasonable attempt to correct obvious errors, but I expect that there are errors of grammar and possibly content that I did not discover before finalizing the note.

## 2021-09-17 ENCOUNTER — HOSPITAL ENCOUNTER (OUTPATIENT)
Dept: LAB | Facility: MEDICAL CENTER | Age: 66
End: 2021-09-17
Attending: INTERNAL MEDICINE
Payer: COMMERCIAL

## 2021-09-17 DIAGNOSIS — D64.9 ANEMIA, UNSPECIFIED TYPE: ICD-10-CM

## 2021-09-17 DIAGNOSIS — E11.69 DYSLIPIDEMIA ASSOCIATED WITH TYPE 2 DIABETES MELLITUS (HCC): ICD-10-CM

## 2021-09-17 DIAGNOSIS — E78.5 DYSLIPIDEMIA ASSOCIATED WITH TYPE 2 DIABETES MELLITUS (HCC): ICD-10-CM

## 2021-09-17 LAB
CHOLEST SERPL-MCNC: 219 MG/DL (ref 100–199)
CREAT UR-MCNC: 106.57 MG/DL
ERYTHROCYTE [DISTWIDTH] IN BLOOD BY AUTOMATED COUNT: 48.7 FL (ref 35.9–50)
EST. AVERAGE GLUCOSE BLD GHB EST-MCNC: 134 MG/DL
HBA1C MFR BLD: 6.3 % (ref 4–5.6)
HCT VFR BLD AUTO: 36.2 % (ref 37–47)
HDLC SERPL-MCNC: 62 MG/DL
HGB BLD-MCNC: 11.9 G/DL (ref 12–16)
IRON SATN MFR SERPL: 30 % (ref 15–55)
IRON SERPL-MCNC: 94 UG/DL (ref 40–170)
LDLC SERPL CALC-MCNC: 144 MG/DL
MCH RBC QN AUTO: 30.7 PG (ref 27–33)
MCHC RBC AUTO-ENTMCNC: 32.9 G/DL (ref 33.6–35)
MCV RBC AUTO: 93.3 FL (ref 81.4–97.8)
MICROALBUMIN UR-MCNC: <1.2 MG/DL
MICROALBUMIN/CREAT UR: NORMAL MG/G (ref 0–30)
PLATELET # BLD AUTO: 319 K/UL (ref 164–446)
PMV BLD AUTO: 9.7 FL (ref 9–12.9)
RBC # BLD AUTO: 3.88 M/UL (ref 4.2–5.4)
TIBC SERPL-MCNC: 315 UG/DL (ref 250–450)
TRIGL SERPL-MCNC: 64 MG/DL (ref 0–149)
UIBC SERPL-MCNC: 221 UG/DL (ref 110–370)
WBC # BLD AUTO: 7.3 K/UL (ref 4.8–10.8)

## 2021-09-17 PROCEDURE — 82570 ASSAY OF URINE CREATININE: CPT

## 2021-09-17 PROCEDURE — 85027 COMPLETE CBC AUTOMATED: CPT

## 2021-09-17 PROCEDURE — 83550 IRON BINDING TEST: CPT

## 2021-09-17 PROCEDURE — 83540 ASSAY OF IRON: CPT

## 2021-09-17 PROCEDURE — 82728 ASSAY OF FERRITIN: CPT

## 2021-09-17 PROCEDURE — 80061 LIPID PANEL: CPT

## 2021-09-17 PROCEDURE — 83036 HEMOGLOBIN GLYCOSYLATED A1C: CPT

## 2021-09-17 PROCEDURE — 36415 COLL VENOUS BLD VENIPUNCTURE: CPT

## 2021-09-17 PROCEDURE — 82043 UR ALBUMIN QUANTITATIVE: CPT

## 2021-09-18 LAB — FERRITIN SERPL-MCNC: 18 NG/ML (ref 10–291)

## 2025-02-20 ENCOUNTER — OFFICE VISIT (OUTPATIENT)
Dept: MEDICAL GROUP | Age: 70
End: 2025-02-20
Payer: MEDICARE

## 2025-02-20 VITALS
HEART RATE: 70 BPM | OXYGEN SATURATION: 98 % | TEMPERATURE: 98.3 F | WEIGHT: 150.6 LBS | SYSTOLIC BLOOD PRESSURE: 104 MMHG | DIASTOLIC BLOOD PRESSURE: 68 MMHG | BODY MASS INDEX: 24.31 KG/M2

## 2025-02-20 DIAGNOSIS — Z12.31 ENCOUNTER FOR SCREENING MAMMOGRAM FOR MALIGNANT NEOPLASM OF BREAST: ICD-10-CM

## 2025-02-20 DIAGNOSIS — S80.02XA CONTUSION OF LEFT KNEE, INITIAL ENCOUNTER: ICD-10-CM

## 2025-02-20 DIAGNOSIS — E11.69 DYSLIPIDEMIA ASSOCIATED WITH TYPE 2 DIABETES MELLITUS (HCC): ICD-10-CM

## 2025-02-20 DIAGNOSIS — Z23 NEED FOR VACCINATION: ICD-10-CM

## 2025-02-20 DIAGNOSIS — Z78.0 POSTMENOPAUSAL: ICD-10-CM

## 2025-02-20 DIAGNOSIS — E78.5 DYSLIPIDEMIA ASSOCIATED WITH TYPE 2 DIABETES MELLITUS (HCC): ICD-10-CM

## 2025-02-20 DIAGNOSIS — Z78.9 VEGETARIAN: ICD-10-CM

## 2025-02-20 LAB
HBA1C MFR BLD: 6.1 % (ref ?–5.8)
POCT INT CON NEG: NEGATIVE
POCT INT CON POS: POSITIVE

## 2025-02-20 PROCEDURE — 3078F DIAST BP <80 MM HG: CPT | Performed by: STUDENT IN AN ORGANIZED HEALTH CARE EDUCATION/TRAINING PROGRAM

## 2025-02-20 PROCEDURE — 3074F SYST BP LT 130 MM HG: CPT | Performed by: STUDENT IN AN ORGANIZED HEALTH CARE EDUCATION/TRAINING PROGRAM

## 2025-02-20 PROCEDURE — 99204 OFFICE O/P NEW MOD 45 MIN: CPT | Mod: 25 | Performed by: STUDENT IN AN ORGANIZED HEALTH CARE EDUCATION/TRAINING PROGRAM

## 2025-02-20 PROCEDURE — 83036 HEMOGLOBIN GLYCOSYLATED A1C: CPT | Performed by: STUDENT IN AN ORGANIZED HEALTH CARE EDUCATION/TRAINING PROGRAM

## 2025-02-20 PROCEDURE — G0009 ADMIN PNEUMOCOCCAL VACCINE: HCPCS | Performed by: STUDENT IN AN ORGANIZED HEALTH CARE EDUCATION/TRAINING PROGRAM

## 2025-02-20 PROCEDURE — 90677 PCV20 VACCINE IM: CPT | Performed by: STUDENT IN AN ORGANIZED HEALTH CARE EDUCATION/TRAINING PROGRAM

## 2025-02-20 RX ORDER — EZETIMIBE 10 MG/1
10 TABLET ORAL DAILY
COMMUNITY

## 2025-02-20 RX ORDER — ROSUVASTATIN CALCIUM 5 MG/1
5 TABLET, COATED ORAL EVERY EVENING
Qty: 100 TABLET | Refills: 3 | Status: SHIPPED | OUTPATIENT
Start: 2025-02-20 | End: 2026-03-27

## 2025-02-20 ASSESSMENT — FIBROSIS 4 INDEX: FIB4 SCORE: 1.28

## 2025-02-20 NOTE — PROGRESS NOTES
Verbal consent was acquired by the patient to use [employer] ambient listening note generation during this visit     Subjective:     HPI:   History of Present Illness  The patient is a 69-year-old female who presents to [facility].    She has been experiencing an increase in her sweet cravings, which she plans to reduce. She is currently on a daily regimen of Jardiance 10 mg for her diabetes, having discontinued metformin due to gastrointestinal side effects. She prefers Jardiance over metformin. She is not on any statins due to previous joint pain issues but is taking Zetia every morning. She had previously tried red yeast rice but discontinued it due to the additional pill burden. She is considering resuming red yeast rice if it proves more beneficial than statins. Her diet is predominantly vegetarian with minimal meat intake and no red meat consumption. She supplements her diet with vitamin B12, taking two tablets daily. She also takes vitamin D 5000 IU, vitamin C, and omega-3. She does not take calcium supplements. She has received her COVID-19 vaccines and is scheduled for a shingles vaccine. She is a vegetarian and eats a lot of seafood.    She reports recent onset of severe left knee pain, described as sharp and poking, following [place]16[place]2025. The pain has limited her ability to perform yoga exercises, particularly planks. She does not experience any catching sensation in the knee. She suspects she may have osteoarthritis and notes that her knees have been deteriorating with age. She occasionally takes Aleve or Tylenol Arthritis for pain management, typically resorting to Aleve only during severe pain episodes. She takes 2 Aleve once a week, which helps her.    She has been experiencing arm pain since receiving her first COVID-19 vaccine, which she attributes to arthritis. Despite undergoing therapy, the pain persists, limiting her ability to remove her tops. She reports no shoulder  issues. She received her second COVID-19 vaccine on the opposite arm without any complications.    SOCIAL HISTORY  She is a full-time caregiver for her .    MEDICATIONS  Jardiance, Zetia, vitamin B12, vitamin D, vitamin C, omega-3, Aleve, Tylenol arthritis.  Past: Metformin, red yeast rice, CoQ10.    IMMUNIZATIONS  She has received her COVID-19 vaccines and is scheduled for a shingles vaccine.    No n/v/d/c, fever, chills, sob, chest pain, knee pain+      Objective:     Exam:  /68 (BP Location: Right arm, Patient Position: Sitting, BP Cuff Size: Adult)   Pulse 70   Temp 36.8 °C (98.3 °F) (Temporal)   Wt 68.3 kg (150 lb 9.6 oz)   SpO2 98%   BMI 24.31 kg/m²  Body mass index is 24.31 kg/m².    Physical Exam    Gen: nad  HENT: ncat, EOMI  Resp: ctabl  Cardiac: rrr, no m  GI: nt/nd  MSK: mild to moderate ttp over lateral joint line of Lt knee as well as lateral collateral ligament. Full Lt knee ROM wo catching or pain. Full wt bearing wo gait abnormmalities   Neuro: no focal deficits, cn 2-12 intact throughout, sensation to light touch intact throughout  Psych: appropriate mood and affect      Results  Laboratory Studies  A1c is 6.1. Cholesterol levels are high.    Assessment & Plan:     1. Dyslipidemia associated with type 2 diabetes mellitus (HCC)  POCT  A1C    rosuvastatin (CRESTOR) 5 MG Tab    Lipid Profile    HEMOGLOBIN A1C    VITAMIN D,25 HYDROXY (DEFICIENCY)    MICROALBUMIN CREAT RATIO URINE      2. Contusion of left knee, initial encounter  DX-KNEE COMPLETE 4+ LEFT    Referral to Physical Therapy      3. Encounter for screening mammogram for malignant neoplasm of breast  MA-SCREENING MAMMO BILAT W/CAD      4. Postmenopausal  DS-BONE DENSITY STUDY (DEXA)      5. Need for vaccination  Pneumococcal Conjugate Vaccine 20-Valent (6 wks+)      6. Vegetarian  VITAMIN B12          Assessment & Plan  1. Diabetes Mellitus.    Her A1c level has improved from 6.6% to 6.1%. She is currently taking Jardiance  10 mg daily and has stopped taking metformin due to gastrointestinal upset.     2. HLD  Pt was tried on atorvastatin 10 mg qd however did not tolerate due to myalgia.    She will continue with Jardiance. A prescription for rosuvastatin 5 mg has been provided to manage her cholesterol levels. She is advised to take CoQ10 along with her vitamin B supplements. She is also advised to take vitamin D 5000 IU daily and calcium 1 g daily for bone health. A mammogram and bone density scan have been ordered. Laboratory tests, including A1c, lipids, vitamin D, and vitamin B12, will be conducted at the end of May 2025.    2. Left Knee Contusion.  The pain in her left knee is likely due to a contusion of the joint line rather than a meniscus injury. She is advised to apply ice to the affected area to reduce inflammation. An x-ray of the left knee has been ordered. Physical therapy has been recommended once or twice a week. She can take Aleve 250 mg and Tylenol 500 mg or 1000 mg together to manage the pain. She is advised against taking Aleve and ibuprofen concurrently. If there is no improvement, an MRI will be considered.    3. Arm Pain.  She reports pain in her arm since receiving the first COVID-19 shot. The pain is localized and does not involve the shoulder. She has been advised to continue with physical therapy and to use ice to manage inflammation. If the pain persists or gets worse, further evaluation may be necessary.    Follow-up  The patient will follow up in 3 months.    HCC Gap Form    Last edited 02/20/25 08:54 PST by Brijesh Yang M.D.           Return in about 3 months (around 5/20/2025) for annual visit.    Please note that this dictation was created using voice recognition software. I have made every reasonable attempt to correct obvious errors, but I expect that there are errors of grammar and possibly content that I did not discover before finalizing the note.

## 2025-03-04 ENCOUNTER — HOSPITAL ENCOUNTER (OUTPATIENT)
Dept: RADIOLOGY | Facility: MEDICAL CENTER | Age: 70
End: 2025-03-04
Attending: STUDENT IN AN ORGANIZED HEALTH CARE EDUCATION/TRAINING PROGRAM
Payer: MEDICARE

## 2025-03-04 DIAGNOSIS — S80.02XA CONTUSION OF LEFT KNEE, INITIAL ENCOUNTER: ICD-10-CM

## 2025-03-04 DIAGNOSIS — Z78.0 POSTMENOPAUSAL: ICD-10-CM

## 2025-03-04 PROCEDURE — 73564 X-RAY EXAM KNEE 4 OR MORE: CPT | Mod: LT

## 2025-03-04 PROCEDURE — 77080 DXA BONE DENSITY AXIAL: CPT

## 2025-03-05 ENCOUNTER — RESULTS FOLLOW-UP (OUTPATIENT)
Dept: MEDICAL GROUP | Age: 70
End: 2025-03-05

## 2025-03-14 ENCOUNTER — PHYSICAL THERAPY (OUTPATIENT)
Dept: PHYSICAL THERAPY | Facility: REHABILITATION | Age: 70
End: 2025-03-14
Attending: STUDENT IN AN ORGANIZED HEALTH CARE EDUCATION/TRAINING PROGRAM
Payer: MEDICARE

## 2025-03-14 DIAGNOSIS — S80.02XA CONTUSION OF LEFT KNEE, INITIAL ENCOUNTER: ICD-10-CM

## 2025-03-14 PROCEDURE — 97110 THERAPEUTIC EXERCISES: CPT

## 2025-03-14 PROCEDURE — 97161 PT EVAL LOW COMPLEX 20 MIN: CPT

## 2025-03-14 SDOH — ECONOMIC STABILITY: GENERAL: QUALITY OF LIFE: FAIR

## 2025-03-14 ASSESSMENT — ENCOUNTER SYMPTOMS
PAIN SCALE: 0
PAIN SCALE AT LOWEST: 0
PAIN SCALE AT HIGHEST: 7

## 2025-03-14 NOTE — OP THERAPY EVALUATION
Outpatient Physical Therapy  INITIAL EVALUATION    [facility] [place]  [address], [address]  [place] [address]  Phone:  [phone]  Fax:  [phone]    Date of Evaluation: 2025    Patient: Luna Carpio  YOB: 1955  MRN: [id number]     Referring Provider: [address] [address]   Referring Diagnosis Contusion of left knee, initial encounter [S80.02XA]     Time Calculation  Start time: 1630  Stop time: 1710 Time Calculation (min): 40 minutes         Chief Complaint: Knee Problem    Visit Diagnoses     ICD-10-CM   1. Contusion of left knee, initial encounter  S80.02XA       Date of onset of impairment: 2025    Subjective:   History of Present Illness:     Date of onset:  2025  Quality of life:  Fair  Prior level of function:  Knee pain after a fall  Pain:     Current pain ratin    At best pain ratin    At worst pain ratin  Diagnostic Tests:     X-ray: abnormal    Activities of Daily Living:     Patient reported ADL status: Limited ability to perform fitness activity  Limited ability to participate in yoga  Patient Goals:     Patient goals for therapy:  Decreased pain and increased strength  Patient is a 69 y.o. female that presents to therapy with L knee pain. States that symptoms were due to injury. Reports the pain quality to be sharp/dull), intermittent and are primarily anterior knee pain. Reports that symptoms now getting better/ not changing. States that aggravating factors are squatting with weight, sitting, kneeling.  States that easng factors are rest. Denies red flags.       Past Medical History:   Diagnosis Date    Allergy     Anemia 2021    Body aches 2020    Cough 2020    Dyslipidemia 3/25/2019    Left axis deviation 3/25/2019    Medication monitoring encounter 2019     IMO load 2020    Renal insufficiency 12/15/2019    Type 2 diabetes mellitus without complication,  without long-term current use of insulin (HCC) 7/1/2019    Vitamin B12 deficiency 3/25/2019    Vitamin D deficiency 3/25/2019     Past Surgical History:   Procedure Laterality Date    ABDOMINAL EXPLORATION      PRIMARY C SECTION       Social History     Tobacco Use    Smoking status: Never    Smokeless tobacco: Never   Substance Use Topics    Alcohol use: Yes     Comment: Occassionally     Family and Occupational History     Socioeconomic History    Marital status:      Spouse name: Not on file    Number of children: Not on file    Years of education: Not on file    Highest education level: Not on file   Occupational History    Not on file       Objective     Postural Observations  Seated posture: poor  Standing posture: poor      Neurological Testing     Reflexes   Left   Patellar (L4): trace (1+)  Achilles (S1): trace (1+)  Ankle clonus reflex: negative  Babinski sign: negative    Right   Patellar (L4): trace (1+)  Achilles (S1): trace (1+)  Ankle clonus reflex: negative  Babinski sign: negative    Myotome testing   Lumbar (left)   L1 (hip flexors): 4+  L2 (hip flexors): 4+  L3 (knee extensors): 4+  L4 (ankle dorsiflexors): 5  L5 (great toe extension): 5  S1 (ankle plantar flexors): 5    Lumbar (right)   L2 (hip flexors): 5  L3 (knee extensors): 5  L4 (ankle dorsiflexors): 5  L5 (great toe extension): 5  S1 (ankle plantar flexors): 5    Dermatome testing   Lumbar (left)   All left lumbar dermatomes intact    Lumbar (right)   All right lumbar dermatomes intact    Active Range of Motion   Left Knee   Flexion: WFL  Extension: WFL    Right Knee   Flexion: WFL  Extension: WFL    Passive Range of Motion   Left Hip   Flexion: WFL  External rotation (90/90): WFL  Internal rotation (90/90): Left hip passive internal rotation 90/90: limited.     Right Hip   Flexion: WFL  External rotation (90/90): WFL  Internal rotation (90/90): Right hip passive internal rotation 90/90: limited.     Patellar Mobility   Left Knee  Hypermobile in the left lateral patellar tendon(s). Hypomobile in the left medial patellar tendon(s).     Right Knee Patellar tendons within functional limits include the medial and lateral.     Strength:      Left Hip   Planes of Motion   Abduction: 4-  Adduction: 4    Right Hip   Planes of Motion   Abduction: 5  Adduction: 4    Tests     Left Knee   Positive patellar compression.   Negative anterior Lachman, posterior drawer, valgus stress test at 0 degrees, valgus stress test at 30 degrees, varus stress test at 0 degrees and varus stress test at 30 degrees.         Therapeutic Exercises (CPT 42252):       Therapeutic Exercise Summary: Access Code: [email or web address]  URL: [email or web address]  Date: 03/14/2025  Prepared by: Gabino Brantley    Program Notes  Ice knee 15min 1-2x day    Exercises  - Straight Leg Raise with External Rotation  - 1 x daily - 7 x weekly - 2 sets - 10 reps  - Clamshell  - 1 x daily - 7 x weekly - 2 sets - 10 reps      Time-based treatments/modalities:    Physical Therapy Timed Treatment Charges  Therapeutic exercise minutes (CPT 51864): 10 minutes      Assessment, Response and Plan:   Impairments: activity intolerance, impaired physical strength and pain with function    Assessment details:  Patient presents with signs and symptoms consistent with a patellar femoral disorder and/or possible bone contusion. Patient limitations include weakness, decreased ROM, and pain. Patient demonstrated pain only with compression and patellar loading. Patient's limitations are limiting her from participation in her yoga class and her maddie class. Patient will benefit from skilled therapy to improve the aforementioned deficits and decrease further functional decline.   Prognosis: good    Goals:   Short Term Goals:   1) Patient's knee extensors will improve by a half muscle grade to facilitate improved stability.  2) Patient's hip abd MMT will improve by a half muscle grade to facilitate improved  control.   Short term goal time span:  2-4 weeks      Long Term Goals:    1) Patient's symptoms will improve to allow for squatting to 90deg without symptoms.  2) Patient's LEFS will improve by 10 to demonstrate functional improvement   Long term goal time span:  6-8 weeks    Plan:   Therapy options:  Physical therapy treatment to continue  Planned therapy interventions:  E Stim Unattended (CPT 91121), Hot or Cold Pack Therapy (CPT 57400), Manual Therapy (CPT 06381), Neuromuscular Re-education (CPT 38825) and Therapeutic Exercise (CPT 79924)  Frequency:  2x week  Duration in weeks:  6  Discussed with:  Patient      Functional Assessment Used  PT Functional Assessment Tool Used: LEFS  PT Functional Assessment Score: 66     Referring provider co-signature:  I have reviewed this plan of care and my co-signature certifies the need for services.    Certification Period: 03/14/2025 to  04/25/25    Physician Signature: ________________________________ Date: ______________

## 2025-03-21 ENCOUNTER — PHYSICAL THERAPY (OUTPATIENT)
Dept: PHYSICAL THERAPY | Facility: REHABILITATION | Age: 70
End: 2025-03-21
Attending: STUDENT IN AN ORGANIZED HEALTH CARE EDUCATION/TRAINING PROGRAM
Payer: MEDICARE

## 2025-03-21 DIAGNOSIS — S80.02XA CONTUSION OF LEFT KNEE, INITIAL ENCOUNTER: ICD-10-CM

## 2025-03-21 PROCEDURE — 97110 THERAPEUTIC EXERCISES: CPT

## 2025-03-21 NOTE — OP THERAPY DAILY TREATMENT
Outpatient Physical Therapy  DAILY TREATMENT     [facility] [place]  [address], [address]  [facility] [address]  Phone:  [phone]  Fax:  [phone]    Date: 03/21/2025    Patient: Luna Carpio  YOB: 1955  MRN: [id number]     Time Calculation    Start time: 1415  Stop time: 1500 Time Calculation (min): 45 minutes         Chief Complaint: Knee Problem    Visit #: 2    SUBJECTIVE:  Patient reports that her symptoms are about 80% improved.     OBJECTIVE:  Current objective measures:           Therapeutic Exercises (CPT 25760):     1. Nu step, x10min    2. Shuttle SL, x5min 4d      Therapeutic Exercise Summary: Access Code: [email or web address]  URL: [email or web address]  Date: 03/21/2025  Prepared by: Gabino Brantley    Program Notes  Ice knee 15min 1-2x day    Exercises  - Straight Leg Raise with External Rotation  - 1 x daily - 7 x weekly - 2 sets - 10 reps  - Clamshell  - 1 x daily - 7 x weekly - 2 sets - 10 reps  - Bird Dog on Swiss Ball  - 1 x daily - 7 x weekly - 2 sets - 10 reps  - Tra  - 1 x daily - 7 x weekly - 2 sets - 10 reps  - Sit to Stand  - 1 x daily - 7 x weekly - 2 sets - 10 reps      Time-based treatments/modalities:    Physical Therapy Timed Treatment Charges  Therapeutic exercise minutes (CPT 27376): 42 minutes      Pain rating (1-10) before treatment:  1  Pain rating (1-10) after treatment:  1    ASSESSMENT:   Response to treatment: Patient is responding well to therapy with an overall decrease in symptoms and improvement in function. Patient to continue with current HEP.     PLAN/RECOMMENDATIONS:   Plan for treatment: therapy treatment to continue next visit.  Planned interventions for next visit: continue with current treatment.

## 2025-03-28 ENCOUNTER — APPOINTMENT (OUTPATIENT)
Dept: PHYSICAL THERAPY | Facility: REHABILITATION | Age: 70
End: 2025-03-28
Attending: STUDENT IN AN ORGANIZED HEALTH CARE EDUCATION/TRAINING PROGRAM
Payer: MEDICARE

## 2025-04-18 ENCOUNTER — HOSPITAL ENCOUNTER (OUTPATIENT)
Dept: RADIOLOGY | Facility: MEDICAL CENTER | Age: 70
End: 2025-04-18
Attending: STUDENT IN AN ORGANIZED HEALTH CARE EDUCATION/TRAINING PROGRAM
Payer: MEDICARE

## 2025-04-18 DIAGNOSIS — Z12.31 ENCOUNTER FOR SCREENING MAMMOGRAM FOR MALIGNANT NEOPLASM OF BREAST: ICD-10-CM

## 2025-04-18 PROCEDURE — 77067 SCR MAMMO BI INCL CAD: CPT

## 2025-04-22 ENCOUNTER — RESULTS FOLLOW-UP (OUTPATIENT)
Dept: MEDICAL GROUP | Age: 70
End: 2025-04-22

## 2025-05-15 ENCOUNTER — HOSPITAL ENCOUNTER (OUTPATIENT)
Dept: LAB | Facility: MEDICAL CENTER | Age: 70
End: 2025-05-15
Attending: STUDENT IN AN ORGANIZED HEALTH CARE EDUCATION/TRAINING PROGRAM
Payer: MEDICARE

## 2025-05-15 DIAGNOSIS — Z78.9 VEGETARIAN: ICD-10-CM

## 2025-05-15 DIAGNOSIS — E78.5 DYSLIPIDEMIA ASSOCIATED WITH TYPE 2 DIABETES MELLITUS (HCC): ICD-10-CM

## 2025-05-15 DIAGNOSIS — E11.69 DYSLIPIDEMIA ASSOCIATED WITH TYPE 2 DIABETES MELLITUS (HCC): ICD-10-CM

## 2025-05-15 LAB
25(OH)D3 SERPL-MCNC: 76 NG/ML (ref 30–100)
CHOLEST SERPL-MCNC: 224 MG/DL (ref 100–199)
EST. AVERAGE GLUCOSE BLD GHB EST-MCNC: 140 MG/DL
HBA1C MFR BLD: 6.5 % (ref 4–5.6)
HDLC SERPL-MCNC: 72 MG/DL
LDLC SERPL CALC-MCNC: 137 MG/DL
TRIGL SERPL-MCNC: 75 MG/DL (ref 0–149)
VIT B12 SERPL-MCNC: 2100 PG/ML (ref 211–911)

## 2025-05-15 PROCEDURE — 82306 VITAMIN D 25 HYDROXY: CPT

## 2025-05-15 PROCEDURE — 80061 LIPID PANEL: CPT

## 2025-05-15 PROCEDURE — 83036 HEMOGLOBIN GLYCOSYLATED A1C: CPT

## 2025-05-15 PROCEDURE — 82607 VITAMIN B-12: CPT

## 2025-05-15 PROCEDURE — 36415 COLL VENOUS BLD VENIPUNCTURE: CPT

## 2025-05-20 ENCOUNTER — OFFICE VISIT (OUTPATIENT)
Dept: MEDICAL GROUP | Age: 70
End: 2025-05-20
Payer: MEDICARE

## 2025-05-20 VITALS
BODY MASS INDEX: 24.01 KG/M2 | OXYGEN SATURATION: 100 % | WEIGHT: 149.4 LBS | HEART RATE: 66 BPM | HEIGHT: 66 IN | SYSTOLIC BLOOD PRESSURE: 106 MMHG | DIASTOLIC BLOOD PRESSURE: 76 MMHG | TEMPERATURE: 97.6 F

## 2025-05-20 DIAGNOSIS — Z00.00 ENCOUNTER FOR MEDICARE ANNUAL WELLNESS EXAM: Primary | ICD-10-CM

## 2025-05-20 DIAGNOSIS — M25.562 CHRONIC PAIN OF BOTH KNEES: ICD-10-CM

## 2025-05-20 DIAGNOSIS — K63.5: ICD-10-CM

## 2025-05-20 DIAGNOSIS — S80.02XA CONTUSION OF LEFT KNEE, INITIAL ENCOUNTER: ICD-10-CM

## 2025-05-20 DIAGNOSIS — R79.89 HIGH SERUM VITAMIN B12: ICD-10-CM

## 2025-05-20 DIAGNOSIS — G89.29 CHRONIC PAIN OF BOTH KNEES: ICD-10-CM

## 2025-05-20 DIAGNOSIS — M19.90 ARTHRITIS: ICD-10-CM

## 2025-05-20 DIAGNOSIS — M25.561 CHRONIC PAIN OF BOTH KNEES: ICD-10-CM

## 2025-05-20 DIAGNOSIS — L20.82 FLEXURAL ECZEMA: ICD-10-CM

## 2025-05-20 DIAGNOSIS — Z12.11 COLON CANCER SCREENING: ICD-10-CM

## 2025-05-20 DIAGNOSIS — Z13.0 SCREENING FOR DEFICIENCY ANEMIA: ICD-10-CM

## 2025-05-20 DIAGNOSIS — M94.0 COSTOCHONDRITIS, ACUTE: ICD-10-CM

## 2025-05-20 DIAGNOSIS — E78.5 DYSLIPIDEMIA ASSOCIATED WITH TYPE 2 DIABETES MELLITUS (HCC): ICD-10-CM

## 2025-05-20 DIAGNOSIS — M85.88 OSTEOPENIA OF LUMBAR SPINE: ICD-10-CM

## 2025-05-20 DIAGNOSIS — E11.69 DYSLIPIDEMIA ASSOCIATED WITH TYPE 2 DIABETES MELLITUS (HCC): ICD-10-CM

## 2025-05-20 PROBLEM — R14.0 ABDOMINAL BLOATING: Status: RESOLVED | Noted: 2021-04-23 | Resolved: 2025-05-20

## 2025-05-20 PROBLEM — D64.9 ANEMIA: Status: RESOLVED | Noted: 2021-06-04 | Resolved: 2025-05-20

## 2025-05-20 PROBLEM — M77.8 TRICEPS TENDONITIS: Status: RESOLVED | Noted: 2021-06-04 | Resolved: 2025-05-20

## 2025-05-20 RX ORDER — ROSUVASTATIN CALCIUM 5 MG/1
5 TABLET, COATED ORAL EVERY EVENING
Qty: 100 TABLET | Refills: 3 | Status: SHIPPED | OUTPATIENT
Start: 2025-05-20 | End: 2026-06-24

## 2025-05-20 ASSESSMENT — ACTIVITIES OF DAILY LIVING (ADL): BATHING_REQUIRES_ASSISTANCE: 0

## 2025-05-20 ASSESSMENT — PATIENT HEALTH QUESTIONNAIRE - PHQ9: CLINICAL INTERPRETATION OF PHQ2 SCORE: 0

## 2025-05-20 ASSESSMENT — ENCOUNTER SYMPTOMS: GENERAL WELL-BEING: GOOD

## 2025-05-20 ASSESSMENT — FIBROSIS 4 INDEX: FIB4 SCORE: 1.28

## 2025-05-20 NOTE — PROGRESS NOTES
Chief Complaint   Patient presents with    Annual Exam       HPI:  Luna Carpio is a 69 y.o. here for Medicare Annual Wellness Visit     Patient Active Problem List    Diagnosis Date Noted    Osteopenia of lumbar spine 05/20/2025    Arthritis 05/20/2025    Contusion of left knee 02/20/2025    Chronic pain of both knees 04/23/2021    Polyp, intestinal 11/18/2019    Flexural eczema 11/18/2019    Chronic bilateral low back pain with left-sided sciatica 04/25/2019    Pain of both hip joints 04/25/2019    Dyslipidemia associated with type 2 diabetes mellitus (HCC) 03/25/2019       Current Medications[1]       Current supplements as per medication list.     Allergies: Patient has no known allergies.    Current social contact/activities:      She  reports that she has never smoked. She has never used smokeless tobacco. She reports current alcohol use. She reports that she does not currently use drugs after having used the following drugs: Marijuana and Oral.  Counseling given: Not Answered      ROS:    Gait: Uses no assistive device  Ostomy: No  Other tubes: No  Amputations: No  Chronic oxygen use: No  Last eye exam: August 2024  Wears hearing aids: No   : Denies any urinary leakage during the last 6 months    Screening:    Depression Screening  Little interest or pleasure in doing things?  0 - not at all  Feeling down, depressed , or hopeless? 0 - not at all  Trouble falling or staying asleep, or sleeping too much?     Feeling tired or having little energy?     Poor appetite or overeating?     Feeling bad about yourself - or that you are a failure or have let yourself or your family down?    Trouble concentrating on things, such as reading the newspaper or watching television?    Moving or speaking so slowly that other people could have noticed.  Or the opposite - being so fidgety or restless that you have been moving around a lot more than usual?     Thoughts that you would be better off dead, or of hurting  yourself?     Patient Health Questionnaire Score:      If depressive symptoms identified deferred to follow up visit unless specifically addressed in assessment and plan.    Interpretation of PHQ-9 Total Score   Score Severity   1-4 No Depression   5-9 Mild Depression   10-14 Moderate Depression   15-19 Moderately Severe Depression   20-27 Severe Depression    Screening for Cognitive Impairment  Do you or any of your friends or family members have any concern about your memory? No  Three Minute Recall (Village, Kitchen, Baby) 3/3    Roberto clock face with all 12 numbers and set the hands to show 10 minutes past 11.  Yes    Cognitive concerns identified deferred for follow up unless specifically addressed in assessment and plan.    Fall Risk Assessment  Has the patient had two or more falls in the last year or any fall with injury in the last year?  Yes    Safety Assessment  Do you always wear your seatbelt?  Yes  Any changes to home needed to function safely? No  Difficulty hearing.  No  Patient counseled about all safety risks that were identified.    Functional Assessment ADLs  Are there any barriers preventing you from cooking for yourself or meeting nutritional needs?  No.    Are there any barriers preventing you from driving safely or obtaining transportation?  No.    Are there any barriers preventing you from using a telephone or calling for help?  No    Are there any barriers preventing you from shopping?  No.    Are there any barriers preventing you from taking care of your own finances?  No    Are there any barriers preventing you from managing your medications?  No    Are there any barriers preventing you from showering, bathing or dressing yourself? No    Are there any barriers preventing you from doing housework or laundry? No    Are there any barriers preventing you from using the toilet?No    Are you currently engaging in any exercise or physical activity?  Yes. dino Miller, yoga    Self-Assessment  of Health  What is your perception of your health? Good    Do you sleep more than six hours a night? Yes    In the past 7 days, how much did pain keep you from doing your normal work? Some    Do you spend quality time with family or friends (virtually or in person)? Yes    Do you usually eat a heart healthy diet that constists of a variety of fruits, vegetables, whole grains and fiber? No    Do you eat foods high in fat and/or Fast Food more than three times per week? No    How concerned are you that your medical conditions are not being well managed? a little    Are you worried that in the next 2 months, you may not have stable housing that you own, rent, or stay in as part of a household? No        Advance Care Planning  Do you have an Advance Directive, Living Will, Durable Power of , or POLST? Yes                 Health Maintenance Summary            Current Care Gaps       Diabetes: Urine Protein Screening (Yearly) Overdue since 9/17/2022 02/20/2025  Order placed for MICROALBUMIN CREAT RATIO URINE by Brijesh Yang M.D.    09/17/2021  MICROALBUMIN CREAT RATIO URINE    07/31/2020  MICROALBUMIN CREAT RATIO URINE    12/06/2019  MICROALBUMIN CREAT RATIO URINE (LAB COLLECT)                      Needs Review       Diabetes: Monofilament / LE Exam (Yearly) Tentatively due on 5/20/2026 05/20/2025  Diabetic Monofilament LE Exam    11/18/2019  Diabetic Monofilament Lower Extremity Exam                      Awaiting Completion       Diabetes: Retinopathy Screening (Yearly) Order placed this encounter      05/20/2025  Order placed for POCT Retinal Eye Exam by Lillyana R Reyes, Med Ass't    09/30/2019  REFERRAL FOR RETINAL SCREENING EXAM              SERUM CREATININE (Yearly) Order placed this encounter      05/20/2025  Order placed for Comp Metabolic Panel by Brijesh Yang M.D.    11/12/2024  COMP METABOLIC PANEL    08/01/2024  COMP METABOLIC PANEL    05/24/2021  Comp Metabolic Panel    07/31/2020  Comp  Metabolic Panel     Only the first 5 history entries have been loaded, but more history exists.            A1c Screening (Every 6 Months) Order placed this encounter      05/20/2025  Order placed for HEMOGLOBIN A1C by Brijesh Yang M.D.    05/15/2025  HEMOGLOBIN A1C    02/20/2025  POCT  A1C    11/12/2024  HEMOGLOBIN A1C    08/01/2024  HEMOGLOBIN A1C      Only the first 5 history entries have been loaded, but more history exists.              Fasting Lipid Profile (Yearly) Order placed this encounter      05/20/2025  Order placed for Lipid Profile by Brijesh Yang M.D.    05/15/2025  Lipid Profile    09/17/2021  Lipid Profile    05/24/2021  Lipid Profile    07/31/2020  Lipid Profile      Only the first 5 history entries have been loaded, but more history exists.              Colorectal Cancer Screening (Colonoscopy - Every 10 Years) Order placed this encounter      05/20/2025  Order placed for Referral to GI for Colonoscopy by Brijesh Yang M.D.    09/01/2018  Colonoscopy (Done)                      Upcoming       Zoster (Shingles) Vaccines (1 of 2) Postponed until 7/20/2025      No completion history exists for this topic.              COVID-19 Vaccine (4 - 2024-25 season) Postponed until 2/20/2026 01/14/2022  Imm Admin: MODERNA SARS-COV-2 VACCINE (12+)    04/30/2021  Imm Admin: MODERNA SARS-COV-2 VACCINE (12+)    04/02/2021  Imm Admin: MODERNA SARS-COV-2 VACCINE (12+)              Mammogram (Yearly) Next due on 4/18/2026 04/18/2025  MA-SCREENING MAMMO BILAT W/TOMOSYNTHESIS W/CAD    06/21/2021  MA-SCREENING MAMMO BILAT W/TOMOSYNTHESIS W/CAD              Annual Wellness Visit (Yearly) Next due on 5/20/2026 05/20/2025  Visit Dx: Encounter for Medicare annual wellness exam              IMM DTaP/Tdap/Td Vaccine (2 - Td or Tdap) Next due on 3/25/2029      03/25/2019  Imm Admin: Tdap Vaccine              Bone Density Scan (Every 5 Years) Next due on 3/4/2030      03/04/2025  DS-BONE DENSITY STUDY (DEXA)                       Completed or No Longer Recommended       Influenza Vaccine (Series Information) Completed      11/15/2024  Imm Admin: Influenza, unspecified formulation    11/03/2022  Imm Admin: Influenza Vaccine, Quadrivalent, Adjuvanted (Pf)              Hepatitis C Screening  Completed      04/05/2019  Hepatitis C Antibody component of HEPATITIS PANEL ACUTE(4 COMPONENTS)              Pneumococcal Vaccine: 50+ Years (Series Information) Completed      02/20/2025  Imm Admin: Pneumococcal Conjugate Vaccine (PCV20)    11/18/2019  Imm Admin: Pneumococcal polysaccharide vaccine (PPSV-23)              Hepatitis A Vaccine (Hep A) (Series Information) Aged Out     No completion history exists for this topic.              HPV Vaccines (Series Information) Aged Out     No completion history exists for this topic.              Polio Vaccine (Inactivated Polio) (Series Information) Aged Out     No completion history exists for this topic.              Meningococcal Immunization (Series Information) Aged Out     No completion history exists for this topic.              Meningococcal B Vaccine (Series Information) Aged Out     No completion history exists for this topic.              Cervical Cancer Screening  Discontinued        Frequency changed to Never automatically (Topic No Longer Applies)    05/24/2021  Pathology Gynecology Specimen    05/24/2021  THINPREP PAP WITH HPV    11/18/2017  Done              Hepatitis B Vaccine (Hep B)  Discontinued     No completion history exists for this topic.                            Patient Care Team:  Brijesh Yang M.D. as PCP - General (Family Medicine)  [facility] (Inactive)  Gabino Brantley, PT, DPT as Physical Therapist (Physical Therapy)      Social History[2]  Family History   Problem Relation Age of Onset    Diabetes Mother     Hypertension Father      She  has a past medical history of Allergy, Anemia (6/4/2021), Arthritis (5/20/2025), Body aches (6/30/2020),  "Cough (6/30/2020), Dyslipidemia (3/25/2019), Left axis deviation (3/25/2019), Medication monitoring encounter (11/18/2019), Renal insufficiency (12/15/2019), Type 2 diabetes mellitus without complication, without long-term current use of insulin (HCC) (7/1/2019), Vitamin B12 deficiency (3/25/2019), and Vitamin D deficiency (3/25/2019).    She has no past medical history of Addisons disease ([facility]), Adrenal disorder ([facility]), Anxiety, Arrhythmia, Asthma, Blood transfusion without reported diagnosis, Cancer ([facility]), Cataract, CHF (congestive heart failure) ([facility]), Clotting disorder ([facility]), COPD (chronic obstructive pulmonary disease) ([facility]), Cushings syndrome ([facility]), Depression, Diabetic neuropathy ([facility]), GERD (gastroesophageal reflux disease), Glaucoma, Goiter, Head ache, Heart attack ([facility]), Heart murmur, HIV (human immunodeficiency virus infection) ([facility]), Hypertension, IBD (inflammatory bowel disease), Meningitis, Migraine, Osteoporosis, Parathyroid disorder ([facility]), Pituitary disease ([facility]), Pulmonary emphysema ([facility]), Seizure ([facility]), Sickle cell disease ([facility]), Stroke ([facility]), Substance abuse ([facility]), Thyroid disease, Tuberculosis, or Urinary tract infection.   Past Surgical History[3]    Exam:   /76 (BP Location: Right arm, Patient Position: Sitting, BP Cuff Size: Adult)   Pulse 66   Temp 36.4 °C (97.6 °F) (Temporal)   Ht 1.664 m (5' 5.5\")   Wt 67.8 kg (149 lb 6.4 oz)   SpO2 100%  Body mass index is 24.48 kg/m².    Hearing good.    Dentition good  Alert, oriented in no acute distress.  Eye contact is good, speech goal directed, affect calm    Assessment and Plan. The following treatment and monitoring plan is recommended:    1. Encounter for Medicare annual wellness exam    2. Flexural eczema  Stable. Pt uses topical cream prn    3. Dyslipidemia associated with type 2 diabetes mellitus (HCC)  - Diabetic Monofilament LE Exam  - POCT Retinal Eye Exam  - Empagliflozin (JARDIANCE) 10 MG Tab tablet; Take 1 Tablet by mouth every " day.  Dispense: 100 Each; Refill: 3  - rosuvastatin (CRESTOR) 5 MG Tab; Take 1 Tablet by mouth every evening.  Dispense: 100 Tablet; Refill: 3  - Lipid Profile; Future  - HEMOGLOBIN A1C; Future  - VITAMIN B12; Future  - Comp Metabolic Panel; Future  - TSH WITH REFLEX TO FT4; Future    4. Contusion of left knee, initial encounter  Almost resolved after working with PT    5. Costochondritis, acute  Caused by lifting weights  Reproducible ttp over Rt lower peristernal area.  Cont conservative mgmt    6. Osteopenia of lumbar spine    7. Arthritis    8. Chronic pain of both knees    9. Screening for deficiency anemia  - CBC WITHOUT DIFFERENTIAL; Future    10. High serum vitamin B12  - VITAMIN B12; Future    11. Colon cancer screening  - Referral to GI for Colonoscopy    12. Polyp, intestinal  - Referral to GI for Colonoscopy    Assessment & Plan  1. Diabetes Mellitus.  - Her A1c level has increased slightly from 6.0 to 6.5, but it remains within an acceptable range.  - She is advised to continue her current regimen of Jardiance 10 mg daily.  - A prescription refill for Jardiance 10 mg will be sent to pharmacy.  - Fasting labs, including cholesterol, lipids, A1c, and CMP, will be conducted after 11/12/2025.    2. Hyperlipidemia.  - Her total cholesterol level is 137, with high HDL and low triglycerides. However, her LDL level needs to be below 100 due to her diabetic status.  - She has been advised to resume statin therapy, as it provides anti-inflammatory benefits and reduces cholesterol levels, thereby decreasing the risk of heart disease and stroke.  - A prescription for a different statin will be sent to pharmacy.  - CoQ10 supplementation is recommended to help manage potential statin-related muscle pain.    3. Costochondritis.  - She experiences intermittent right-sided chest pain, likely due to costochondritis.  - The pain is reproducible upon palpation, indicating it is not cardiac-related.  - She is advised to  continue her weightlifting exercises, gradually increasing the weight to 10 pounds.  - She should monitor the pain and adjust her exercise routine as needed.    4. Arthritis.  - She has arthritis in her hands, arms, hips and knees.  - Topical treatments such as Voltaren gel and lidocaine patches are recommended for acute pain.  - She can continue using Tylenol Arthritis for pain management. NSAIDs like Aleve or ibuprofen can be used sparingly for severe pain.  - She is encouraged to continue yoga and strengthening exercises. If the condition worsens, steroid injections or surgical options may be considered, and a referral to an orthopedic specialist will be made.    5. Osteopenia.  - Her bone density scan revealed osteopenia in the lumbar spine but normal density in the hips. There is no evidence of osteoporosis.  - She is advised to continue her daily intake of vitamin D (1412-4082 IUs) and calcium (1 g).  - A repeat bone density scan will be scheduled in 5 years.    6. Health Maintenance.  - She is advised to reduce her daily intake of vitamin B12 to one tablet.  - Colonoscopy ordered     Follow-up  The patient will follow up in 6 months.        Services suggested: No services needed at this time  Health Care Screening: Age-appropriate preventive services recommended by USPTF and ACIP covered by Medicare were discussed today. Services ordered if indicated and agreed upon by the patient.  Referrals offered: Community-based lifestyle interventions to reduce health risks and promote self-management and wellness, fall prevention, nutrition, physical activity, tobacco-use cessation, weight loss, and mental health services as per orders if indicated.    Discussion today about general wellness and lifestyle habits:    Prevent falls and reduce trip hazards; Cautioned about securing or removing rugs.  Have a working fire alarm and carbon monoxide detector;   Engage in regular physical activity and social activities      Follow-up: Return in about 6 months (around 11/20/2025) for hgb a1C, lab results.          [1]   Current Outpatient Medications   Medication Sig Dispense Refill    Empagliflozin (JARDIANCE) 10 MG Tab tablet Take 1 Tablet by mouth every day. 100 Each 3    rosuvastatin (CRESTOR) 5 MG Tab Take 1 Tablet by mouth every evening. 100 Tablet 3     No current facility-administered medications for this visit.   [2]   Social History  Tobacco Use    Smoking status: Never    Smokeless tobacco: Never   Vaping Use    Vaping status: Never Used   Substance Use Topics    Alcohol use: Yes     Comment: Occassionally    Drug use: Not Currently     Types: Marijuana, Oral     Comment: gummies for pain occ   [3]   Past Surgical History:  Procedure Laterality Date    ABDOMINAL EXPLORATION      PRIMARY C SECTION

## 2025-05-28 NOTE — Clinical Note
REFERRAL APPROVAL NOTICE         Sent on May 28, 2025                   Luna Carpio  [address]  [place] [address]                   Dear Ms. Carpio,    After a careful review of the medical information and benefit coverage, [facility] has processed your referral. See below for additional details.    If applicable, you must be actively enrolled with your insurance for coverage of the authorized service. If you have any questions regarding your coverage, please contact your insurance directly.    REFERRAL INFORMATION   Referral #:  [id number]  Referred-To Provider    Referred-By Provider:  Gastroenterology    Brijesh Yang M.D.   [facility]      [address] [place] [address] [address] [place] [id number] [phone] [address] [place] [place] [address]  [phone]    Referral Start Date:  05/20/2025  Referral End Date:   05/20/2026             SCHEDULING  If you do not already have an appointment, please call [phone] to make an appointment.     MORE INFORMATION  If you do not already have a [employer] account, sign up at: [email or web address]  You can access your medical information, make appointments, see lab results, billing information, and more.  If you have questions regarding this referral, please contact  the [facility] Referrals department at:             [phone]. Monday - Friday 8:00AM - 5:00PM.     Sincerely,    [facility]

## 2025-06-27 ENCOUNTER — TELEPHONE (OUTPATIENT)
Dept: MEDICAL GROUP | Age: 70
End: 2025-06-27
Payer: MEDICARE

## 2025-06-27 NOTE — TELEPHONE ENCOUNTER
VOICEMAIL  1. Caller Name: Luna Carpio                       Call Back Number: [phone]     2. Message: Pt left a voicemail stating that she was having reactions to taking her rosuvastatin, it caused bleeding in her stool. She stated she stopped taking it and tried taking OTC cholesterol medications and that has been working better for her with no side effects of the blood. Pt stated she is doing good but I advised her to still be seen to make sure that everything is okay and get documentation of the side effects and discuss regimens, of agreed and has an appointment with another provider since Dr. Yang is out of office.     3. Patient approves office to leave a detailed voicemail/MyChart message: N\A

## 2025-06-30 ENCOUNTER — OFFICE VISIT (OUTPATIENT)
Dept: MEDICAL GROUP | Age: 70
End: 2025-06-30
Payer: MEDICARE

## 2025-06-30 VITALS
TEMPERATURE: 98.2 F | DIASTOLIC BLOOD PRESSURE: 62 MMHG | HEIGHT: 66 IN | BODY MASS INDEX: 23.95 KG/M2 | OXYGEN SATURATION: 98 % | WEIGHT: 149 LBS | SYSTOLIC BLOOD PRESSURE: 108 MMHG | RESPIRATION RATE: 16 BRPM | HEART RATE: 68 BPM

## 2025-06-30 DIAGNOSIS — R32 URINARY INCONTINENCE, UNSPECIFIED TYPE: ICD-10-CM

## 2025-06-30 DIAGNOSIS — K92.1 HEMATOCHEZIA: ICD-10-CM

## 2025-06-30 DIAGNOSIS — Z12.11 COLON CANCER SCREENING: Primary | ICD-10-CM

## 2025-06-30 DIAGNOSIS — E78.5 DYSLIPIDEMIA ASSOCIATED WITH TYPE 2 DIABETES MELLITUS (HCC): ICD-10-CM

## 2025-06-30 DIAGNOSIS — E11.69 DYSLIPIDEMIA ASSOCIATED WITH TYPE 2 DIABETES MELLITUS (HCC): ICD-10-CM

## 2025-06-30 RX ORDER — UBIQUINOL 100 MG
CAPSULE ORAL
COMMUNITY

## 2025-06-30 ASSESSMENT — FIBROSIS 4 INDEX: FIB4 SCORE: 1.28

## 2025-06-30 ASSESSMENT — ENCOUNTER SYMPTOMS: BLOOD IN STOOL: 0

## 2025-06-30 NOTE — Clinical Note
REFERRAL APPROVAL NOTICE         Sent on June 30, 2025                   Luna Carpio  [address]  [facility] [place] [address]                   Dear Ms. Carpio,    After a careful review of the medical information and benefit coverage, [facility] has processed your referral. See below for additional details.    If applicable, you must be actively enrolled with your insurance for coverage of the authorized service. If you have any questions regarding your coverage, please contact your insurance directly.    REFERRAL INFORMATION   Referral #:  [id number]  Referred-To Provider    Referred-By Provider:  Gastroenterology    Abbey Mitchell M.D.   [facility]      [address] [place] [id number] [phone] [address] [place] [address] [place] [id number] [phone]    Referral Start Date:  06/30/2025  Referral End Date:   06/30/2026             SCHEDULING  If you do not already have an appointment, please call [phone] to make an appointment.     MORE INFORMATION  If you do not already have a [employer] account, sign up at: [email or web address]  You can access your medical information, make appointments, see lab results, billing information, and more.  If you have questions regarding this referral, please contact  the [facility] Referrals department at:             [phone]. Monday - Friday 8:00AM - 5:00PM.     Sincerely,    [facility]

## 2025-06-30 NOTE — PROGRESS NOTES
"CC:   Chief Complaint   Patient presents with    Medication Reaction     I was prescribed Rosuvastatin for two weeks, started to see blood in my stool. Once I stopped the medication the blood cleared up.\"     The primary encounter diagnosis was Colon cancer screening. Diagnoses of Hematochezia, Dyslipidemia associated with type 2 diabetes mellitus (HCC), and Urinary incontinence, unspecified type were also pertinent to this visit.  Verbal consent was acquired by the patient to use [employer] ambient listening note generation during this visit Yes     History of Present Illness  Luna is a pleasant 69 y.o. female presenting for evaluation of medication side effects.    She experienced an unusual side effect from rosuvastatin, which manifested as blood in her stool. The blood was neither bright red nor dark but of a medium hue. This was a new symptom for her. Upon discontinuing the medication, the bleeding ceased. She had been on rosuvastatin for 2 weeks before noticing the blood. She has since started taking over-the-counter cholesterol supplements, including CoQ10 and red yeast rice, and is monitoring her diet.     She does not report any feelings of fatigue. Her last colonoscopy was performed 11? years ago, during which polyps were identified. She has previously been prescribed atorvastatin, which resulted in significant joint pain.    She has diabetes and is currently on Jardiance 10 mg. She follows a low carbohydrate diet and engages in regular exercise, including strength and conditioning, Pilates, Angela 3 times a week, weightlifting twice a week, and yoga twice a week.    She reports occasional urinary incontinence, particularly when she needs to urinate urgently. She has attempted Kegel exercises, but they have not been beneficial.    Past Medical History[1]    Current Medications and Prescriptions Ordered in Epic[2]    Review of Systems   Gastrointestinal:  Negative for blood in stool and melena.   All " "other systems reviewed and are negative.    Objective:     Exam:  /62 (BP Location: Left arm, Patient Position: Sitting, BP Cuff Size: Adult)   Pulse 68   Temp 36.8 °C (98.2 °F) (Temporal)   Resp 16   Ht 1.664 m (5' 5.5\")   Wt 67.6 kg (149 lb)   SpO2 98%   BMI 24.42 kg/m²  Body mass index is 24.42 kg/m².    Physical Exam  Constitutional:       Appearance: Normal appearance.   HENT:      Head: Normocephalic and atraumatic.   Pulmonary:      Effort: Pulmonary effort is normal.   Neurological:      Mental Status: She is alert and oriented to person, place, and time.   Psychiatric:         Mood and Affect: Mood normal.         Behavior: Behavior normal.         Thought Content: Thought content normal.         Judgment: Judgment normal.       Results: I have independently reviewed results     Latest Reference Range & Units 05/15/25 10:27   Glycohemoglobin 4.0 - 5.6 % 6.5 (H)   Estim. Avg Glu mg/dL 140   Cholesterol,Tot 100 - 199 mg/dL 224 (H)   Triglycerides 0 - 149 mg/dL 75   HDL >=40 mg/dL 72   LDL <100 mg/dL 137 (H)   25-Hydroxy   Vitamin D 25 30 - 100 ng/mL 76   Vitamin B12 -True Cobalamin 211 - 911 pg/mL 2100 (H)   (H): Data is abnormally high  Results      Assessment & Plan:   Luna  is a pleasant 69 y.o. female with the following -   Assessment & Plan  1. Hyperlipidemia.  - Cholesterol levels are significantly elevated.  - Experienced blood in stool, attributed to rosuvastatin; unusual side effect, likely coincidental.  - Taking over-the-counter cholesterol supplements, including CoQ10 and red yeast rice; discussed similarity to rosuvastatin and potential for bleeding.   - Referral for colonoscopy initiated due to history of polyps and recent bleeding episode; cholesterol levels to be reassessed in 11/2025 by PCP .     2. Diabetes mellitus.  - Currently on Jardiance 10 mg and follows a low carbohydrate diet.   - Engages in regular physical activities, including strength and conditioning, Pilates, " Angela, weights, and yoga.  - Will continue current regimen.     3. Urinary incontinence.  - Reports difficulty controlling bladder; Kegel exercises tried without success.  - Referral to urologist made.  - Oxybutynin discussed as potential treatment option; concerns about side effects expressed.  - Will consider further and discuss with primary care doctor in 11/2025.     Problem List Items Addressed This Visit       Dyslipidemia associated with type 2 diabetes mellitus (HCC)     Other Visit Diagnoses         Colon cancer screening    -  Primary    Relevant Orders    Referral to GI for Colonoscopy      Hematochezia        Relevant Orders    Referral to GI for Colonoscopy      Urinary incontinence, unspecified type        Relevant Orders    Referral to Urology          Return if symptoms worsen or fail to improve, for with PCP .    Please note that this dictation was created using voice recognition software. I have made every reasonable attempt to correct obvious errors, but I expect that there are errors of grammar and possibly content that I did not discover before finalizing the note.       [1]   Past Medical History:  Diagnosis Date    Allergy     Anemia 6/4/2021    Arthritis 5/20/2025    Body aches 6/30/2020    Cough 6/30/2020    Dyslipidemia 3/25/2019    Left axis deviation 3/25/2019    Medication monitoring encounter 11/18/2019     IMO load March 2020    Renal insufficiency 12/15/2019    Type 2 diabetes mellitus without complication, without long-term current use of insulin (HCC) 7/1/2019    Vitamin B12 deficiency 3/25/2019    Vitamin D deficiency 3/25/2019   [2]   Current Outpatient Medications Ordered in Epic   Medication Sig Dispense Refill    Plant Sterols and Stanols (CHOLESTOFF PO) Take  by mouth.      Ubiquinol (QUNOL COQ10/UBIQUINOL/CHRISTEL) 100 MG Cap Take  by mouth.      Red Yeast Rice Extract (RED YEAST RICE PO) Take  by mouth.      Empagliflozin (JARDIANCE) 10 MG Tab tablet Take 1 Tablet by mouth every  day. 100 Each 3    rosuvastatin (CRESTOR) 5 MG Tab Take 1 Tablet by mouth every evening. (Patient not taking: Reported on 6/30/2025) 100 Tablet 3     No current Epic-ordered facility-administered medications on file.

## 2025-07-08 NOTE — Clinical Note
REFERRAL APPROVAL NOTICE         Sent on July 8, 2025                   Luna Carpio  [address]  [facility] [address]                   Dear Ms. Carpio,    After a careful review of the medical information and benefit coverage, [facility] has processed your referral. See below for additional details.    If applicable, you must be actively enrolled with your insurance for coverage of the authorized service. If you have any questions regarding your coverage, please contact your insurance directly.    REFERRAL INFORMATION   Referral #:  [id number]  Referred-To Department    Referred-By Provider:  Urology    Abbey Mitchell M.D.   [facility]      [address] [place] [address]  [phone] [address]  [place] [id number] [phone]    Referral Start Date:  06/30/2025  Referral End Date:   06/30/2026             SCHEDULING  If you do not already have an appointment, please call [phone] to make an appointment.     MORE INFORMATION  If you do not already have a [email or web address] account, sign up at: [email or web address]  You can access your medical information, make appointments, see lab results, billing information, and more.  If you have questions regarding this referral, please contact  the [facility] Referrals department at:             [phone]. Monday - Friday 8:00AM - 5:00PM.     Sincerely,    [facility]

## 2025-08-04 ENCOUNTER — HOSPITAL ENCOUNTER (OUTPATIENT)
Facility: MEDICAL CENTER | Age: 70
End: 2025-08-04
Attending: NURSE PRACTITIONER
Payer: MEDICARE

## 2025-08-04 ENCOUNTER — APPOINTMENT (OUTPATIENT)
Dept: FAMILY PLANNING/WOMEN'S HEALTH CLINIC | Facility: PHYSICIAN GROUP | Age: 70
End: 2025-08-04
Payer: MEDICARE

## 2025-08-04 DIAGNOSIS — E11.69 DYSLIPIDEMIA ASSOCIATED WITH TYPE 2 DIABETES MELLITUS (HCC): ICD-10-CM

## 2025-08-04 DIAGNOSIS — E78.5 DYSLIPIDEMIA ASSOCIATED WITH TYPE 2 DIABETES MELLITUS (HCC): ICD-10-CM

## 2025-08-04 LAB
CREAT UR-MCNC: 49.3 MG/DL
MICROALBUMIN UR-MCNC: <1.2 MG/DL
MICROALBUMIN/CREAT UR: NORMAL MG/G (ref 0–30)

## 2025-08-04 PROCEDURE — 82570 ASSAY OF URINE CREATININE: CPT

## 2025-08-04 PROCEDURE — 82043 UR ALBUMIN QUANTITATIVE: CPT

## 2025-08-04 SDOH — ECONOMIC STABILITY: FOOD INSECURITY: WITHIN THE PAST 12 MONTHS, THE FOOD YOU BOUGHT JUST DIDN'T LAST AND YOU DIDN'T HAVE MONEY TO GET MORE.: NEVER TRUE

## 2025-08-04 SDOH — ECONOMIC STABILITY: TRANSPORTATION INSECURITY: IN THE PAST 12 MONTHS, HAS LACK OF TRANSPORTATION KEPT YOU FROM MEDICAL APPOINTMENTS OR FROM GETTING MEDICATIONS?: NO

## 2025-08-04 SDOH — ECONOMIC STABILITY: FOOD INSECURITY: WITHIN THE PAST 12 MONTHS, YOU WORRIED THAT YOUR FOOD WOULD RUN OUT BEFORE YOU GOT THE MONEY TO BUY MORE.: NEVER TRUE

## 2025-08-04 SDOH — ECONOMIC STABILITY: FOOD INSECURITY: HOW HARD IS IT FOR YOU TO PAY FOR THE VERY BASICS LIKE FOOD, HOUSING, MEDICAL CARE, AND HEATING?: NOT VERY HARD

## 2025-08-04 SDOH — ECONOMIC STABILITY: HOUSING INSECURITY: AT ANY TIME IN THE PAST 12 MONTHS, WERE YOU HOMELESS OR LIVING IN A SHELTER (INCLUDING NOW)?: NO

## 2025-08-04 SDOH — ECONOMIC STABILITY: HOUSING INSECURITY: IN THE LAST 12 MONTHS, WAS THERE A TIME WHEN YOU WERE NOT ABLE TO PAY THE MORTGAGE OR RENT ON TIME?: NO

## 2025-08-04 ASSESSMENT — PATIENT HEALTH QUESTIONNAIRE - PHQ9: CLINICAL INTERPRETATION OF PHQ2 SCORE: 0

## 2025-08-04 ASSESSMENT — FIBROSIS 4 INDEX: FIB4 SCORE: 1.28

## 2025-08-04 ASSESSMENT — PAIN SCALES - GENERAL: PAINLEVEL_OUTOF10: 7=MODERATE-SEVERE PAIN

## 2025-08-04 ASSESSMENT — ENCOUNTER SYMPTOMS: GENERAL WELL-BEING: GOOD

## 2025-08-04 ASSESSMENT — ACTIVITIES OF DAILY LIVING (ADL)
BATHING_REQUIRES_ASSISTANCE: 0
LACK_OF_TRANSPORTATION: NO